# Patient Record
Sex: FEMALE | Race: BLACK OR AFRICAN AMERICAN | Employment: UNEMPLOYED | URBAN - METROPOLITAN AREA
[De-identification: names, ages, dates, MRNs, and addresses within clinical notes are randomized per-mention and may not be internally consistent; named-entity substitution may affect disease eponyms.]

---

## 2017-02-02 ENCOUNTER — OFFICE VISIT (OUTPATIENT)
Dept: FAMILY MEDICINE CLINIC | Age: 37
End: 2017-02-02

## 2017-02-02 VITALS
SYSTOLIC BLOOD PRESSURE: 126 MMHG | BODY MASS INDEX: 32.57 KG/M2 | WEIGHT: 177 LBS | TEMPERATURE: 98 F | HEIGHT: 62 IN | HEART RATE: 79 BPM | RESPIRATION RATE: 18 BRPM | DIASTOLIC BLOOD PRESSURE: 90 MMHG

## 2017-02-02 DIAGNOSIS — F41.9 ANXIETY: ICD-10-CM

## 2017-02-02 DIAGNOSIS — R53.83 FATIGUE, UNSPECIFIED TYPE: ICD-10-CM

## 2017-02-02 DIAGNOSIS — Z00.00 ANNUAL PHYSICAL EXAM: Primary | ICD-10-CM

## 2017-02-02 DIAGNOSIS — R73.03 PREDIABETES: ICD-10-CM

## 2017-02-02 DIAGNOSIS — M77.8 ELBOW TENDONITIS: ICD-10-CM

## 2017-02-02 RX ORDER — ALPRAZOLAM 0.5 MG/1
0.5 TABLET ORAL
Qty: 30 TAB | Refills: 0 | Status: SHIPPED | OUTPATIENT
Start: 2017-02-02 | End: 2017-12-01 | Stop reason: SDUPTHER

## 2017-02-02 RX ORDER — CITALOPRAM 20 MG/1
20 TABLET, FILM COATED ORAL DAILY
Qty: 30 TAB | Refills: 5 | Status: SHIPPED | OUTPATIENT
Start: 2017-02-02 | End: 2017-04-03 | Stop reason: ALTCHOICE

## 2017-02-02 RX ORDER — DICLOFENAC SODIUM 75 MG/1
75 TABLET, DELAYED RELEASE ORAL 2 TIMES DAILY
Qty: 60 TAB | Refills: 0 | Status: SHIPPED | OUTPATIENT
Start: 2017-02-02 | End: 2017-09-18

## 2017-02-02 RX ORDER — PREDNISONE 5 MG/1
TABLET ORAL
Qty: 21 TAB | Refills: 0 | Status: SHIPPED | OUTPATIENT
Start: 2017-02-02 | End: 2017-04-03 | Stop reason: ALTCHOICE

## 2017-02-02 NOTE — PATIENT INSTRUCTIONS
Elbow: Exercises  Your Care Instructions  Here are some examples of exercises for elbows. Start each exercise slowly. Ease off the exercise if you start to have pain. Your doctor or physical therapist will tell you when you can start these exercises and which ones will work best for you. How to do the exercises  Wrist flexor stretch    1. Extend your arm in front of you with your palm up. 2. Bend your wrist, pointing your hand toward the floor. 3. With your other hand, gently bend your wrist farther until you feel a mild to moderate stretch in your forearm. 4. Hold for at least 15 to 30 seconds. Repeat 2 to 4 times. Wrist extensor stretch    Repeat steps 1 to 4 of the stretch above but begin with your extended hand palm down. Ball or sock squeeze    1. Hold a tennis ball (or a rolled-up sock) in your hand. 2. Make a fist around the ball (or sock) and squeeze. 3. Hold for about 6 seconds, and then relax for up to 10 seconds. 4. Repeat 8 to 12 times. 5. Switch the ball (or sock) to your other hand and do 8 to 12 times. Wrist deviation    1. Sit so that your arm is supported but your hand hangs off the edge of a flat surface, such as a table. 2. Hold your hand out like you are shaking hands with someone. 3. Move your hand up and down. 4. Repeat this motion 8 to 12 times. 5. Switch arms. 6. Try to do this exercise twice with each hand. Wrist curls    1. Place your forearm on a table with your hand hanging over the edge of the table, palm up. 2. Place a 1- to 2-pound weight in your hand. This may be a dumbbell, a can of food, or a filled water bottle. 3. Slowly raise and lower the weight while keeping your forearm on the table and your palm facing up. 4. Repeat this motion 8 to 12 times. 5. Switch arms, and do steps 1 through 4.  6. Repeat with your hand facing down toward the floor. Switch arms. Biceps curls    1.  Sit leaning forward with your legs slightly spread and your left hand on your left thigh. 2. Place your right elbow on your right thigh, and hold the weight with your forearm horizontal.  3. Slowly curl the weight up and toward your chest.  4. Repeat this motion 8 to 12 times. 5. Switch arms, and do steps 1 through 4. Follow-up care is a key part of your treatment and safety. Be sure to make and go to all appointments, and call your doctor if you are having problems. It's also a good idea to know your test results and keep a list of the medicines you take. Where can you learn more? Go to http://aidan-faiza.info/. Enter M279 in the search box to learn more about \"Elbow: Exercises. \"  Current as of: May 23, 2016  Content Version: 11.1  © 0509-2819 [employer]. Care instructions adapted under license by [email or web address] (which disclaims liability or warranty for this information). If you have questions about a medical condition or this instruction, always ask your healthcare professional. [address] any warranty or liability for your use of this information.

## 2017-02-02 NOTE — PROGRESS NOTES
Chief Complaint   Patient presents with    Physical    Labs     Patient in office today for physical and fasting labs; Would like medication refilled; Pt also have c/o of right elbow pain; states pain is worse during certain movement; pain began November. 1. Have you been to the ER, urgent care clinic since your last visit? Hospitalized since your last visit? No    2. Have you seen or consulted any other health care providers outside of the [address] [place] [address] since your last visit? Include any pap smears or colon screening. No    Noticed in November before thanksgiving after she moved. Only in the elbow. Certain movements and lifting aggravate the pain. Stiff. Denies any swelling or redness. Taking motrin OTC as needed. Denies any tingling in fingers or decreased  strength. Notices it daily. Denies any cp, sob, and dyspnea. Denies any ha or dizziness. Having occasional HAs. History of HAs after having hysterectomy about 3 years ago. Noticing she has been having them more frequently. Waking up with a HA. HA every other week and lasting for about 2 days. Day before yesterday had a HA. Pt is constantly on the phone so this can make the HA worse. Associated nausea. Usually HA is behind the eye. Last eye exam was 2 weeks ago. Pt has been following a diet to lose weight. Drinking more shakes. Wondering if sx are due to being hungry. Unrelieved by motrin or BC powder. Denies any photo or phonophobia. Denies any other concerns at this time. Chief Complaint   Patient presents with   Evaristo Campos     she is a 43y.o. year old female who presents for evalution. Reviewed PmHx, RxHx, FmHx, SocHx, AllgHx and updated and dated in the chart.     Review of Systems - negative except as listed above in the HPI    Objective:     Vitals:    02/02/17 1046   BP: 126/90   Pulse: 79   Resp: 18   Temp: 98 °F (36.7 °C)   TempSrc: Oral   Weight: 177 lb (80.3 kg)   Height: 5' 2\" (1.575 m)     Physical Examination: General appearance - alert, well appearing, and in no distress  Mental status - normal mood and behavior  Eyes - pupils equal and reactive, extraocular eye movements intact  Ears - bilateral TM's and external ear canals normal  Nose - normal and patent, no erythema, discharge or polyps and normal nontender sinuses  Mouth - mucous membranes moist, pharynx normal without lesions  Neck - supple, no significant adenopathy, carotids upstroke normal bilaterally, no bruits, thyroid exam: thyroid is normal in size without nodules or tenderness  Chest - clear to auscultation, no wheezes, rales or rhonchi, symmetric air entry  Heart - normal rate, regular rhythm, normal S1, S2  Musculoskeletal - no joint tenderness, deformity or swelling, no muscular tenderness noted  Extremities - peripheral pulses normal, no ankle edema, no clubbing or cyanosis  Skin - normal coloration and turgor, no rashes, no suspicious skin lesions noted    Assessment/ Plan:   Erika was seen today for physical and labs. Diagnoses and all orders for this visit:    Annual physical exam  -     AMB POC URINALYSIS DIP STICK AUTO W/O MICRO  -     LIPID PANEL  -     METABOLIC PANEL, COMPREHENSIVE  -     CBC WITH AUTOMATED DIFF  Healthy appearing 43year old female. Elbow tendonitis  -     predniSONE (STERAPRED) 5 mg dose pack; See administration instruction per 5mg dose pack  -     diclofenac EC (VOLTAREN) 75 mg EC tablet; Take 1 Tab by mouth two (2) times a day. Take meds as directed. Reviewed stretches and other supportive measures. Follow up if sx persist or worsen. Anxiety  -     ALPRAZolam (XANAX) 0.5 mg tablet; Take 1 Tab by mouth daily as needed for Anxiety. -     citalopram (CELEXA) 20 mg tablet; Take 1 Tab by mouth daily. Refilled rx. Continue celexa daily and xanax sparingly as needed for acute sx. Prediabetes  -     HEMOGLOBIN A1C WITH EAG  Will notify results and deviate plan based on findings.    Fatigue, unspecified type  -     TSH 3RD GENERATION  -     VITAMIN D, 25 HYDROXY  Will notify results and deviate plan based on findings. Enc to continue with diet and exercise efforts to facilitate weight loss. Other orders  -     Cancel: PAP IG, APTIMA HPV AND RFX 16/18,45 ([id number])  No longer a candidate for paps. Had a pap after hysterectomy that was normal.      Follow-up Disposition:  Return if symptoms worsen or fail to improve. I have discussed the diagnosis with the patient and the intended plan as seen in the above orders. The patient has received an after-visit summary and questions were answered concerning future plans. Medication Side Effects and Warnings were discussed with patient: yes  Patient Labs were reviewed and or requested: yes  Patient Past Records were reviewed and or requested  yes  Patient / Caregiver Understanding of treatment plan was verbalized during office visit YES    FRANK Lepe    Patient Instructions        Elbow: Exercises  Your Care Instructions  Here are some examples of exercises for elbows. Start each exercise slowly. Ease off the exercise if you start to have pain. Your doctor or physical therapist will tell you when you can start these exercises and which ones will work best for you. How to do the exercises  Wrist flexor stretch    1. Extend your arm in front of you with your palm up. 2. Bend your wrist, pointing your hand toward the floor. 3. With your other hand, gently bend your wrist farther until you feel a mild to moderate stretch in your forearm. 4. Hold for at least 15 to 30 seconds. Repeat 2 to 4 times. Wrist extensor stretch    Repeat steps 1 to 4 of the stretch above but begin with your extended hand palm down. Ball or sock squeeze    1. Hold a tennis ball (or a rolled-up sock) in your hand. 2. Make a fist around the ball (or sock) and squeeze. 3. Hold for about 6 seconds, and then relax for up to 10 seconds. 4. Repeat 8 to 12 times.   5. Switch the ball (or sock) to your other hand and do 8 to 12 times. Wrist deviation    1. Sit so that your arm is supported but your hand hangs off the edge of a flat surface, such as a table. 2. Hold your hand out like you are shaking hands with someone. 3. Move your hand up and down. 4. Repeat this motion 8 to 12 times. 5. Switch arms. 6. Try to do this exercise twice with each hand. Wrist curls    1. Place your forearm on a table with your hand hanging over the edge of the table, palm up. 2. Place a 1- to 2-pound weight in your hand. This may be a dumbbell, a can of food, or a filled water bottle. 3. Slowly raise and lower the weight while keeping your forearm on the table and your palm facing up. 4. Repeat this motion 8 to 12 times. 5. Switch arms, and do steps 1 through 4.  6. Repeat with your hand facing down toward the floor. Switch arms. Biceps curls    1. Sit leaning forward with your legs slightly spread and your left hand on your left thigh. 2. Place your right elbow on your right thigh, and hold the weight with your forearm horizontal.  3. Slowly curl the weight up and toward your chest.  4. Repeat this motion 8 to 12 times. 5. Switch arms, and do steps 1 through 4. Follow-up care is a key part of your treatment and safety. Be sure to make and go to all appointments, and call your doctor if you are having problems. It's also a good idea to know your test results and keep a list of the medicines you take. Where can you learn more? Go to http://aidan-faiza.info/. Enter M279 in the search box to learn more about \"Elbow: Exercises. \"  Current as of: May 23, 2016  Content Version: 11.1  © 2869-4898 [employer]. Care instructions adapted under license by [employer] (which disclaims liability or warranty for this information).  If you have questions about a medical condition or this instruction, always ask your healthcare professional. [employer] disclaims any warranty or liability for your use of this information.

## 2017-02-02 NOTE — PROGRESS NOTES
Chief Complaint   Patient presents with    Physical    Labs     Patient in office today for physical and fasting labs; would like medication refilled; also have c/o of right elbow pain; states pain is worse during certain movement; that began November. 1. Have you been to the ER, urgent care clinic since your last visit? Hospitalized since your last visit? No    2. Have you seen or consulted any other health care providers outside of the [address] [place] [address] since your last visit? Include any pap smears or colon screening.  No

## 2017-02-02 NOTE — MR AVS SNAPSHOT
Visit Information Date & Time Provider Department Dept. Phone Encounter #  
 2/2/2017 10:30 AM Rangel Lorenzo NP [facility] [phone] [id number] Follow-up Instructions Return if symptoms worsen or fail to improve. Upcoming Health Maintenance Date Due INFLUENZA AGE 9 TO ADULT 8/1/2016 PAP AKA CERVICAL CYTOLOGY 6/17/2018 DTaP/Tdap/Td series (2 - Td) 6/1/2024 Allergies as of 2/2/2017  Review Complete On: 2/2/2017 By: Rangel Lorenzo NP Severity Noted Reaction Type Reactions Sulfa (Sulfonamide Antibiotics) High 10/23/2014   Systemic Hives, Itching Current Immunizations  Never Reviewed Name Date Tdap 6/1/2014 Not reviewed this visit You Were Diagnosed With   
  
 Codes Comments Annual physical exam    -  Primary ICD-10-CM: Z00.00 ICD-9-CM: V70.0 Elbow tendonitis     ICD-10-CM: M77.8 ICD-9-CM: 727.09 Anxiety     ICD-10-CM: F41.9 ICD-9-CM: 300.00 Prediabetes     ICD-10-CM: R73.03 
ICD-9-CM: 790.29 Fatigue, unspecified type     ICD-10-CM: R53.83 ICD-9-CM: 780.79 Vitals BP Pulse Temp Resp Height(growth percentile) Weight(growth percentile) 126/90 (BP 1 Location: Right arm, BP Patient Position: Sitting) 79 98 °F (36.7 °C) (Oral) 18 5' 2\" (1.575 m) 177 lb (80.3 kg) LMP BMI OB Status Smoking Status (Exact Date) 32.37 kg/m2 Hysterectomy Never Smoker Vitals History BMI and BSA Data Body Mass Index Body Surface Area  
 32.37 kg/m 2 1.87 m 2 Preferred Pharmacy Pharmacy Name Phone [address], [address] [place] [address] [phone] Your Updated Medication List  
  
   
This list is accurate as of: 2/2/17 11:27 AM.  Always use your most recent med list.  
  
  
  
  
 ALPRAZolam 0.5 mg tablet Commonly known as:  Cain Sequin Take 1 Tab by mouth daily as needed for Anxiety. citalopram 20 mg tablet Commonly known as:  Ama Pierce Take 1 Tab by mouth daily. diclofenac EC 75 mg EC tablet Commonly known as:  VOLTAREN Take 1 Tab by mouth two (2) times a day. predniSONE 5 mg dose pack Commonly known as:  STERAPRED See administration instruction per 5mg dose pack Prescriptions Printed Refills ALPRAZolam (XANAX) 0.5 mg tablet 0 Sig: Take 1 Tab by mouth daily as needed for Anxiety. Class: Print Route: Oral  
  
Prescriptions Sent to Pharmacy Refills  
 citalopram (CELEXA) 20 mg tablet 5 Sig: Take 1 Tab by mouth daily. Class: Normal  
 Pharmacy: [address] Ph #: [phone] Route: Oral  
 predniSONE (STERAPRED) 5 mg dose pack 0 Sig: See administration instruction per 5mg dose pack Class: Normal  
 Pharmacy: [address] Ph #: [phone]  
 diclofenac EC (VOLTAREN) 75 mg EC tablet 0 Sig: Take 1 Tab by mouth two (2) times a day. Class: Normal  
 Pharmacy: [address] Ph #: [phone] Route: Oral  
  
We Performed the Following AMB POC URINALYSIS DIP STICK AUTO W/O MICRO [19459 CPT(R)] CBC WITH AUTOMATED DIFF [20262 CPT(R)] HEMOGLOBIN A1C WITH EAG [22412 CPT(R)] LIPID PANEL [97127 CPT(R)] METABOLIC PANEL, COMPREHENSIVE [63740 CPT(R)] TSH 3RD GENERATION [84224 CPT(R)] VITAMIN D, 25 HYDROXY [id number] CPT(R)] Follow-up Instructions Return if symptoms worsen or fail to improve. Patient Instructions Elbow: Exercises Your Care Instructions Here are some examples of exercises for elbows. Start each exercise slowly. Ease off the exercise if you start to have pain. Your doctor or physical therapist will tell you when you can start these exercises and which ones will work best for you. How to do the exercises Wrist flexor stretch 1. Extend your arm in front of you with your palm up. 2. Bend your wrist, pointing your hand toward the floor. 3. With your other hand, gently bend your wrist farther until you feel a mild to moderate stretch in your forearm. 4. Hold for at least 15 to 30 seconds. Repeat 2 to 4 times. Wrist extensor stretch Repeat steps 1 to 4 of the stretch above but begin with your extended hand palm down. Ball or sock squeeze 1. Hold a tennis ball (or a rolled-up sock) in your hand. 2. Make a fist around the ball (or sock) and squeeze. 3. Hold for about 6 seconds, and then relax for up to 10 seconds. 4. Repeat 8 to 12 times. 5. Switch the ball (or sock) to your other hand and do 8 to 12 times. Wrist deviation 1. Sit so that your arm is supported but your hand hangs off the edge of a flat surface, such as a table. 2. Hold your hand out like you are shaking hands with someone. 3. Move your hand up and down. 4. Repeat this motion 8 to 12 times. 5. Switch arms. 6. Try to do this exercise twice with each hand. Wrist curls 1. Place your forearm on a table with your hand hanging over the edge of the table, palm up. 2. Place a 1- to 2-pound weight in your hand. This may be a dumbbell, a can of food, or a filled water bottle. 3. Slowly raise and lower the weight while keeping your forearm on the table and your palm facing up. 4. Repeat this motion 8 to 12 times. 5. Switch arms, and do steps 1 through 4. 
6. Repeat with your hand facing down toward the floor. Switch arms. Biceps curls 1. Sit leaning forward with your legs slightly spread and your left hand on your left thigh. 2. Place your right elbow on your right thigh, and hold the weight with your forearm horizontal. 
3. Slowly curl the weight up and toward your chest. 
4. Repeat this motion 8 to 12 times. 5. Switch arms, and do steps 1 through 4. Follow-up care is a key part of your treatment and safety.  Be sure to make and go to all appointments, and call your doctor if you are having problems. It's also a good idea to know your test results and keep a list of the medicines you take. Where can you learn more? Go to http://aidan-faiza.info/. Enter M279 in the search box to learn more about \"Elbow: Exercises. \" Current as of: May 23, 2016 Content Version: 11.1 © 2282-6647 [employer]. Care instructions adapted under license by [facility] (which disclaims liability or warranty for this information). If you have questions about a medical condition or this instruction, always ask your healthcare professional. [address] any warranty or liability for your use of this information. Introducing [facility]! Dear Damien Gill: 
Thank you for requesting a K[employer] account. Our records indicate that you already have an active K[employer] account. You can access your account anytime at [email or web address] Did you know that you can access your hospital and ER discharge instructions at any time in K[employer]um? You can also review all of your test results from your hospital stay or ER visit. Additional Information If you have questions, please visit the Frequently Asked Questions section of the K[employer]um website at [email or web address]/. Remember, K[employer]um is NOT to be used for urgent needs. For medical emergencies, dial 911. Now available from your iPhone and Android! Please provide this summary of care documentation to your next provider. Your primary care clinician is listed as Mallory Alejandre. If you have any questions after today's visit, please call [phone].

## 2017-02-03 DIAGNOSIS — R73.03 PREDIABETES: Primary | ICD-10-CM

## 2017-02-03 DIAGNOSIS — E55.9 VITAMIN D DEFICIENCY: ICD-10-CM

## 2017-02-03 LAB
25(OH)D3+25(OH)D2 SERPL-MCNC: 18.6 NG/ML (ref 30–100)
ALBUMIN SERPL-MCNC: 4.3 G/DL (ref 3.5–5.5)
ALBUMIN/GLOB SERPL: 1.4 {RATIO} (ref 1.1–2.5)
ALP SERPL-CCNC: 57 IU/L (ref 39–117)
ALT SERPL-CCNC: 12 IU/L (ref 0–32)
AST SERPL-CCNC: 11 IU/L (ref 0–40)
BASOPHILS # BLD AUTO: 0 X10E3/UL (ref 0–0.2)
BASOPHILS NFR BLD AUTO: 0 %
BILIRUB SERPL-MCNC: 0.3 MG/DL (ref 0–1.2)
BUN SERPL-MCNC: 11 MG/DL (ref 6–20)
BUN/CREAT SERPL: 15 (ref 8–20)
CALCIUM SERPL-MCNC: 8.9 MG/DL (ref 8.7–10.2)
CHLORIDE SERPL-SCNC: 102 MMOL/L (ref 96–106)
CHOLEST SERPL-MCNC: 157 MG/DL (ref 100–199)
CO2 SERPL-SCNC: 25 MMOL/L (ref 18–29)
CREAT SERPL-MCNC: 0.72 MG/DL (ref 0.57–1)
EOSINOPHIL # BLD AUTO: 0.1 X10E3/UL (ref 0–0.4)
EOSINOPHIL NFR BLD AUTO: 2 %
ERYTHROCYTE [DISTWIDTH] IN BLOOD BY AUTOMATED COUNT: 15.4 % (ref 12.3–15.4)
EST. AVERAGE GLUCOSE BLD GHB EST-MCNC: 137 MG/DL
GLOBULIN SER CALC-MCNC: 3 G/DL (ref 1.5–4.5)
GLUCOSE SERPL-MCNC: 86 MG/DL (ref 65–99)
HBA1C MFR BLD: 6.4 % (ref 4.8–5.6)
HCT VFR BLD AUTO: 36.4 % (ref 34–46.6)
HDLC SERPL-MCNC: 47 MG/DL
HGB BLD-MCNC: 11.6 G/DL (ref 11.1–15.9)
IMM GRANULOCYTES # BLD: 0 X10E3/UL (ref 0–0.1)
IMM GRANULOCYTES NFR BLD: 0 %
INTERPRETATION, 910389: NORMAL
LDLC SERPL CALC-MCNC: 92 MG/DL (ref 0–99)
LYMPHOCYTES # BLD AUTO: 2 X10E3/UL (ref 0.7–3.1)
LYMPHOCYTES NFR BLD AUTO: 22 %
MCH RBC QN AUTO: 25.5 PG (ref 26.6–33)
MCHC RBC AUTO-ENTMCNC: 31.9 G/DL (ref 31.5–35.7)
MCV RBC AUTO: 80 FL (ref 79–97)
MONOCYTES # BLD AUTO: 0.5 X10E3/UL (ref 0.1–0.9)
MONOCYTES NFR BLD AUTO: 5 %
NEUTROPHILS # BLD AUTO: 6.2 X10E3/UL (ref 1.4–7)
NEUTROPHILS NFR BLD AUTO: 71 %
PLATELET # BLD AUTO: 319 X10E3/UL (ref 150–379)
POTASSIUM SERPL-SCNC: 4.2 MMOL/L (ref 3.5–5.2)
PROT SERPL-MCNC: 7.3 G/DL (ref 6–8.5)
RBC # BLD AUTO: 4.55 X10E6/UL (ref 3.77–5.28)
SODIUM SERPL-SCNC: 140 MMOL/L (ref 134–144)
TRIGL SERPL-MCNC: 90 MG/DL (ref 0–149)
TSH SERPL DL<=0.005 MIU/L-ACNC: 2.1 UIU/ML (ref 0.45–4.5)
VLDLC SERPL CALC-MCNC: 18 MG/DL (ref 5–40)
WBC # BLD AUTO: 8.8 X10E3/UL (ref 3.4–10.8)

## 2017-02-03 RX ORDER — ERGOCALCIFEROL 1.25 MG/1
50000 CAPSULE ORAL
Qty: 12 CAP | Refills: 1 | Status: SHIPPED | OUTPATIENT
Start: 2017-02-03 | End: 2017-08-21 | Stop reason: SDUPTHER

## 2017-02-03 RX ORDER — METFORMIN HYDROCHLORIDE 500 MG/1
500 TABLET, EXTENDED RELEASE ORAL
Qty: 30 TAB | Refills: 2 | Status: SHIPPED | OUTPATIENT
Start: 2017-02-03 | End: 2017-09-18

## 2017-02-03 NOTE — PROGRESS NOTES
The following message was sent to pt via [facility] [employer] [facility] portal in reference to lab results:    Good morning Ms. Lucien Cabezas are the results of your most recent lab work. I have the following recommendations:    1. Your CBC which looks at your white blood cells, red blood cells, and hemoglobin came back looking normal. No sign of infection or anemia. 2. Your metabolic panel which looks at your blood glucose, liver function, and kidney function looks perfect. 3. Your cholesterol came back looking great so keep up the good work with diet and exercise. 4. Your TSH which screens for thyroid disease came back normal. This means you do not have hyper or hypothyroidism. 5. Your hemoglobin a1c has worsened and you are almost considered to be type 2 diabetic. I would like to start you on Metformin with dinner to prevent this from worsening. Side effects include nausea, bloating, and diarrhea, but these usually subside over time with use. You should also try and follow a low carb diet. Try to watch your portion sizes and limit your intake of sugar and carbohydrates. We want to prevent this from developing into diabetes because having diabetes increases your risk for kidney disease, stroke, heart attack, and vision loss. We can talk more about this during your next office visit. 6. Your vitamin D level is very low or deficient. I would like you to take a once weekly vitamin D supplement over the next 3 months to replete this. Once you have finished that, start taking an over the counter calcium and vitamin D supplement that contains 2,000 IUs of vitamin D daily to prevent your levels from dropping again. Having normal vitamin D levels is important because you need vitamin D to absorb calcium into the bone and having low vitamin D levels increases your risk for osteoporosis down the road. Lets recheck these labs in 3 months.  Please do not hesitate to call me or schedule an appointment to be seen if you need anything else in the meantime :)    Tram Pearl, CARSONP-C

## 2017-02-09 ENCOUNTER — OFFICE VISIT (OUTPATIENT)
Dept: FAMILY MEDICINE CLINIC | Age: 37
End: 2017-02-09

## 2017-02-09 VITALS
TEMPERATURE: 98.3 F | OXYGEN SATURATION: 99 % | HEIGHT: 62 IN | WEIGHT: 175.13 LBS | SYSTOLIC BLOOD PRESSURE: 153 MMHG | RESPIRATION RATE: 20 BRPM | HEART RATE: 60 BPM | DIASTOLIC BLOOD PRESSURE: 108 MMHG | BODY MASS INDEX: 32.23 KG/M2

## 2017-02-09 DIAGNOSIS — G44.59 OTHER COMPLICATED HEADACHE SYNDROME: Primary | ICD-10-CM

## 2017-02-09 RX ORDER — KETOROLAC TROMETHAMINE 30 MG/ML
60 INJECTION, SOLUTION INTRAMUSCULAR; INTRAVENOUS ONCE
Qty: 1 VIAL | Refills: 0
Start: 2017-02-09 | End: 2017-02-09

## 2017-02-09 NOTE — PROGRESS NOTES
1. Have you been to the ER, urgent care clinic since your last visit? Hospitalized since your last visit? No    2. Have you seen or consulted any other health care providers outside of the [address] [place] [address] since your last visit? Include any pap smears or colon screening. No   Chief Complaint   Patient presents with    Headache     having frequent HA- moving from back head to eyes     Chief Complaint   Patient presents with    Headache     having frequent HA- moving from back head to eyes     she is a 43y.o. year old female who presents for evalution. Reviewed PmHx, RxHx, FmHx, SocHx, AllgHx and updated and dated in the chart. Patient Active Problem List    Diagnosis    Prediabetes    Vitamin D deficiency    Elevated blood sugar    Menorrhagia       Review of Systems - negative except as listed above in the HPI    Objective:     Vitals:    02/09/17 0844   BP: (!) 153/108   Pulse: 60   Resp: 20   Temp: 98.3 °F (36.8 °C)   TempSrc: Oral   SpO2: 99%   Weight: 175 lb 2 oz (79.4 kg)   Height: 5' 2\" (1.575 m)     Physical Examination: General appearance - alert, well appearing, and in no distress  Chest - clear to auscultation, no wheezes, rales or rhonchi, symmetric air entry  Heart - normal rate, regular rhythm, normal S1, S2, no murmurs, rubs, clicks or gallops    Assessment/ Plan:   Erika was seen today for headache. Diagnoses and all orders for this visit:    Other complicated headache syndrome  -     ketorolac tromethamine (TORADOL) 60 mg/2 mL soln; 2 mL by IntraMUSCular route once for 1 dose. -     KETOROLAC TROMETHAMINE INJ  -     HI THER/PROPH/DIAG INJECTION, SUBCUT/IM  -inc HA source of inc BP       Follow-up Disposition:  Return if symptoms worsen or fail to improve. I have discussed the diagnosis with the patient and the intended plan as seen in the above orders. The patient understands and agrees with the plan.  The patient has received an after-visit summary and questions were answered concerning future plans. Medication Side Effects and Warnings were discussed with patient  Patient Labs were reviewed and or requested:  Patient Past Records were reviewed and or requested    Shelli Todd M.D. There are no Patient Instructions on file for this visit.

## 2017-02-09 NOTE — MR AVS SNAPSHOT
Visit Information Date & Time Provider Department Dept. Phone Encounter #  
 2/9/2017  8:30 AM Misha Yost MD [address] [facility] [phone] [id number] Follow-up Instructions Return if symptoms worsen or fail to improve. Upcoming Health Maintenance Date Due  
 PAP AKA CERVICAL CYTOLOGY 6/17/2018 DTaP/Tdap/Td series (2 - Td) 6/1/2024 Allergies as of 2/9/2017  Review Complete On: 2/9/2017 By: Misha Yost MD  
  
 Severity Noted Reaction Type Reactions Sulfa (Sulfonamide Antibiotics) High 10/23/2014   Systemic Hives, Itching Current Immunizations  Never Reviewed Name Date Tdap 6/1/2014 Not reviewed this visit You Were Diagnosed With   
  
 Codes Comments Other complicated headache syndrome    -  Primary ICD-10-CM: G44.59 
ICD-9-CM: 339.44 Vitals BP Pulse Temp Resp Height(growth percentile) Weight(growth percentile) (!) 153/108 (BP 1 Location: Left arm, BP Patient Position: Sitting) 60 98.3 °F (36.8 °C) (Oral) 20 5' 2\" (1.575 m) 175 lb 2 oz (79.4 kg) LMP SpO2 BMI OB Status Smoking Status (Exact Date) 99% 32.03 kg/m2 Hysterectomy Never Smoker Vitals History BMI and BSA Data Body Mass Index Body Surface Area 32.03 kg/m 2 1.86 m 2 Preferred Pharmacy Pharmacy Name Phone [place] [phone] Your Updated Medication List  
  
   
This list is accurate as of: 2/9/17  9:34 AM.  Always use your most recent med list.  
  
  
  
  
 ALPRAZolam 0.5 mg tablet Commonly known as:  Cecillia Meredith Take 1 Tab by mouth daily as needed for Anxiety. citalopram 20 mg tablet Commonly known as:  Tammie Jose Alberto Take 1 Tab by mouth daily. diclofenac EC 75 mg EC tablet Commonly known as:  VOLTAREN Take 1 Tab by mouth two (2) times a day. ergocalciferol 50,000 unit capsule Commonly known as:  ERGOCALCIFEROL Take 1 Cap by mouth every seven (7) days. ketorolac tromethamine 60 mg/2 mL Soln Commonly known as:  TORADOL  
2 mL by IntraMUSCular route once for 1 dose. metFORMIN  mg tablet Commonly known as:  GLUCOPHAGE XR Take 1 Tab by mouth daily (with dinner). predniSONE 5 mg dose pack Commonly known as:  STERAPRED See administration instruction per 5mg dose pack We Performed the Following KETOROLAC TROMETHAMINE INJ [ [facility]] MS THER/PROPH/DIAG INJECTION, SUBCUT/IM [id number] CPT(R)] Follow-up Instructions Return if symptoms worsen or fail to improve. Introducing [facility] SERVICES! Dear Damien Gill: 
Thank you for requesting a CytoLogic account. Our records indicate that you already have an active CytoLogic account. You can access your account anytime at [email or web address] Did you know that you can access your hospital and ER discharge instructions at any time in CytoLogic? You can also review all of your test results from your hospital stay or ER visit. Additional Information If you have questions, please visit the Frequently Asked Questions section of the CytoLogic website at [email or web address] Remember, CytoLogic is NOT to be used for urgent needs. For medical emergencies, dial 911. Now available from your iPhone and Android! Please provide this summary of care documentation to your next provider. Your primary care clinician is listed as Mallory Alejandre. If you have any questions after today's visit, please call [phone].

## 2017-02-13 ENCOUNTER — OFFICE VISIT (OUTPATIENT)
Dept: FAMILY MEDICINE CLINIC | Age: 37
End: 2017-02-13

## 2017-02-13 VITALS — WEIGHT: 177 LBS | HEIGHT: 62 IN | RESPIRATION RATE: 20 BRPM | BODY MASS INDEX: 32.57 KG/M2

## 2017-02-13 DIAGNOSIS — Z11.1 PPD SCREENING TEST: Primary | ICD-10-CM

## 2017-02-15 LAB
MM INDURATION POC: 0 MM (ref 0–5)
PPD POC: NORMAL NEGATIVE

## 2017-04-03 ENCOUNTER — OFFICE VISIT (OUTPATIENT)
Dept: FAMILY MEDICINE CLINIC | Age: 37
End: 2017-04-03

## 2017-04-03 VITALS
HEART RATE: 76 BPM | RESPIRATION RATE: 16 BRPM | HEIGHT: 62 IN | OXYGEN SATURATION: 98 % | BODY MASS INDEX: 32.39 KG/M2 | DIASTOLIC BLOOD PRESSURE: 76 MMHG | WEIGHT: 176 LBS | SYSTOLIC BLOOD PRESSURE: 118 MMHG | TEMPERATURE: 98.7 F

## 2017-04-03 DIAGNOSIS — R05.9 COUGH: ICD-10-CM

## 2017-04-03 DIAGNOSIS — F41.9 ANXIETY: ICD-10-CM

## 2017-04-03 DIAGNOSIS — N76.0 ACUTE VAGINITIS: ICD-10-CM

## 2017-04-03 DIAGNOSIS — N89.8 VAGINAL DISCHARGE: ICD-10-CM

## 2017-04-03 DIAGNOSIS — J40 BRONCHITIS: Primary | ICD-10-CM

## 2017-04-03 RX ORDER — SERTRALINE HYDROCHLORIDE 50 MG/1
50 TABLET, FILM COATED ORAL DAILY
Qty: 30 TAB | Refills: 1 | Status: SHIPPED | OUTPATIENT
Start: 2017-04-03 | End: 2018-03-04 | Stop reason: SDUPTHER

## 2017-04-03 RX ORDER — AZITHROMYCIN 250 MG/1
TABLET, FILM COATED ORAL
Qty: 6 TAB | Refills: 0 | Status: SHIPPED | OUTPATIENT
Start: 2017-04-03 | End: 2017-08-18 | Stop reason: ALTCHOICE

## 2017-04-03 RX ORDER — CODEINE PHOSPHATE AND GUAIFENESIN 10; 100 MG/5ML; MG/5ML
SOLUTION ORAL
Qty: 120 ML | Refills: 0 | Status: SHIPPED | OUTPATIENT
Start: 2017-04-03 | End: 2017-08-18 | Stop reason: ALTCHOICE

## 2017-04-03 NOTE — MR AVS SNAPSHOT
Visit Information Date & Time Provider Department Dept. Phone Encounter #  
 4/3/2017  2:45 PM Jan Trammell NP [address] [facility] [phone] [id number] Follow-up Instructions Return if symptoms worsen or fail to improve. Upcoming Health Maintenance Date Due  
 PAP AKA CERVICAL CYTOLOGY 6/17/2018 DTaP/Tdap/Td series (2 - Td) 6/1/2024 Allergies as of 4/3/2017  Review Complete On: 4/3/2017 By: Jan Trammell NP Severity Noted Reaction Type Reactions Sulfa (Sulfonamide Antibiotics) High 10/23/2014   Systemic Hives, Itching Current Immunizations  Reviewed on 2/13/2017 Name Date  
 TB Skin Test (PPD) Intradermal 2/13/2017 Tdap 6/1/2014 Not reviewed this visit You Were Diagnosed With   
  
 Codes Comments Vaginal discharge    -  Primary ICD-10-CM: N89.8 ICD-9-CM: 623.5 Acute vaginitis     ICD-10-CM: N76.0 ICD-9-CM: 616.10 Bronchitis     ICD-10-CM: J40 ICD-9-CM: 729 Cough     ICD-10-CM: R05 ICD-9-CM: 032. 2 Vitals BP Pulse Temp Resp Height(growth percentile) Weight(growth percentile) 118/76 76 98.7 °F (37.1 °C) (Oral) 16 5' 2\" (1.575 m) 176 lb (79.8 kg) LMP SpO2 BMI OB Status Smoking Status (Exact Date) 98% 32.19 kg/m2 Hysterectomy Never Smoker Vitals History BMI and BSA Data Body Mass Index Body Surface Area  
 32.19 kg/m 2 1.87 m 2 Preferred Pharmacy Pharmacy Name Phone [facility], [address] [phone] Your Updated Medication List  
  
   
This list is accurate as of: 4/3/17  3:02 PM.  Always use your most recent med list.  
  
  
  
  
 ALPRAZolam 0.5 mg tablet Commonly known as:  Hemphill Hummingbird Take 1 Tab by mouth daily as needed for Anxiety. azithromycin 250 mg tablet Commonly known as:  Chava Sages Take 2 tabs po today then 1 tab po x 4 days  
  
 citalopram 20 mg tablet Commonly known as:  Tyler Gonzalez Take 1 Tab by mouth daily. diclofenac EC 75 mg EC tablet Commonly known as:  VOLTAREN Take 1 Tab by mouth two (2) times a day. ergocalciferol 50,000 unit capsule Commonly known as:  ERGOCALCIFEROL Take 1 Cap by mouth every seven (7) days. guaiFENesin-codeine 100-10 mg/5 mL solution Commonly known as:  ROBITUSSIN AC Use 1-2 tsp at bedtime prn  
  
 metFORMIN  mg tablet Commonly known as:  GLUCOPHAGE XR Take 1 Tab by mouth daily (with dinner). Prescriptions Printed Refills  
 guaiFENesin-codeine (ROBITUSSIN AC) 100-10 mg/5 mL solution 0 Sig: Use 1-2 tsp at bedtime prn Class: Print Prescriptions Sent to Pharmacy Refills  
 azithromycin (ZITHROMAX) 250 mg tablet 0 Sig: Take 2 tabs po today then 1 tab po x 4 days Class: Normal  
 Pharmacy: [facility] #: [phone] We Performed the Following [address] [id number] Custom] Follow-up Instructions Return if symptoms worsen or fail to improve. Patient Instructions Reactive Airway Disease: Care Instructions Your Care Instructions Reactive airway disease is a breathing problem that appears as wheezing, a whistling noise in your airways. It may be caused by a viral or bacterial infection, allergies, tobacco smoke, or something else in the environment. When you are around these triggers, your body releases chemicals that make the airways get tight. Reactive airway disease is a lot like asthma. Both can cause wheezing. But asthma is ongoing, while reactive airway disease may occur only now and then. Tests can be done to tell whether you have asthma. You may take the same medicines used to treat asthma. Good home care and follow-up care with your doctor can help you recover. Follow-up care is a key part of your treatment and safety.  Be sure to make and go to all appointments, and call your doctor if you are having problems. It's also a good idea to know your test results and keep a list of the medicines you take. How can you care for yourself at home? · Take your medicines exactly as prescribed. Call your doctor if you think you are having a problem with your medicine. · Do not smoke or allow others to smoke around you. If you need help quitting, talk to your doctor about stop-smoking programs and medicines. These can increase your chances of quitting for good. · If you know what caused your wheezing (such as perfume or the odor of household chemicals), try to avoid it in the future. · Wash your hands several times a day, and consider using hand gels or wipes that contain alcohol. This can prevent colds and other infections. When should you call for help? Call 911 anytime you think you may need emergency care. For example, call if: 
· You have severe trouble breathing. Watch closely for changes in your health, and be sure to contact your doctor if: 
· You cough up yellow, dark brown, or bloody mucus. · You have a fever. · Your wheezing gets worse. Where can you learn more? Go to http://aidan-faiza.info/. Enter T452 in the search box to learn more about \"Reactive Airway Disease: Care Instructions. \" Current as of: May 23, 2016 Content Version: 11.2 © 2808-2563 Healthwise, Incorporated. Care instructions adapted under license by [employer]imb[employer] (which disclaims liability or warranty for this information). If you have questions about a medical condition or this instruction, always ask your healthcare professional. [place] [address] any warranty or liability for your use of this information. Introducing [facility] & HEALTH SERVICES! Dear Giovanna Luna: 
Thank you for requesting a OZON.ru account. Our records indicate that you already have an active OZON.ru account.   You can access your account anytime at [email or web address] Did you know that you can access your hospital and ER discharge instructions at any time in Crambu? You can also review all of your test results from your hospital stay or ER visit. Additional Information If you have questions, please visit the Frequently Asked Questions section of the Crambu website at [email or web address] Remember, Crambu is NOT to be used for urgent needs. For medical emergencies, dial 911. Now available from your iPhone and Android! Please provide this summary of care documentation to your next provider. Your primary care clinician is listed as Mallory Alejandre. If you have any questions after today's visit, please call [phone].

## 2017-04-03 NOTE — PROGRESS NOTES
Chief Complaint   Patient presents with    Gyn Exam    Anxiety     Discuss Changing Medication     Cough     x one week; Tried several OTC Medicatio w/o relief       1. Have you been to the ER, urgent care clinic since your last visit? Hospitalized since your last visit? No    2. Have you seen or consulted any other health care providers outside of the [address] [place] [address] since your last visit? Include any pap smears or colon screening.  No

## 2017-04-03 NOTE — PROGRESS NOTES
Chief Complaint   Patient presents with    Gyn Exam    Anxiety     Discuss Changing Medication     Cough     x one week; Tried several OTC Medication w/o relief     she is a 43y.o. year old female who presents for evalution. Pt having some vaginal discharge and irritation, requesting STD testing. Is sexually active. Pt states has been having congestion and coughing since last week. Taking numerous OTC but nothing is really helping. Lots of post-nasal drip, keeping pt up at night time. Celexa pt has taken previously and was helpful but tried restarting and caused excessive fatigue. Would like new medication today that is less sedating. Reviewed PmHx, RxHx, FmHx, SocHx, AllgHx and updated and dated in the chart. Review of Systems - negative except as listed above in the HPI    Objective:     Vitals:    04/03/17 1442   BP: 118/76   Pulse: 76   Resp: 16   Temp: 98.7 °F (37.1 °C)   TempSrc: Oral   SpO2: 98%   Weight: 176 lb (79.8 kg)   Height: 5' 2\" (1.575 m)     Physical Examination: General appearance - alert, well appearing, and in no distress  Mental status - alert, oriented to person, place, and time, anxious  Ears - bilateral TM's and external ear canals normal  Nose - normal nontender sinuses, mucosal congestion and mucosal erythema  Mouth - mucous membranes moist, pharynx normal without lesions and erythematous  Neck - supple, no significant adenopathy  Chest - clear to auscultation, no wheezes, rales or rhonchi, symmetric air entry, coarse breath sounds  Heart - normal rate, regular rhythm, normal S1, S2, no murmurs, rubs, clicks or gallops  Pelvic - exam declined by the patient    Assessment/ Plan:   Erika was seen today for gyn exam, anxiety and cough. Diagnoses and all orders for this visit:    Vaginal discharge  -     NUSWAB VAGINITIS PLUS  Will decide on F/U after reviewing labs. Acute vaginitis  -     NUSWAB VAGINITIS PLUS  Will decide on F/U after reviewing labs. Bronchitis  -     azithromycin (ZITHROMAX) 250 mg tablet; Take 2 tabs po today then 1 tab po x 4 days  New rx. Discussed and encouraged rest and clear fluids, if congestion is thick should avoid dairy. Recommended hot showers with steam and elevating head of bed. May use Vitamin C or Echinacea in addition to current therapy. Cough  -     guaiFENesin-codeine (ROBITUSSIN AC) 100-10 mg/5 mL solution; Use 1-2 tsp at bedtime prn  New rx. Anxiety  -     sertraline (ZOLOFT) 50 mg tablet; Take 1 Tab by mouth daily. New rx. Positive thinking, rely on support system. F/U prn    Pt voiced understanding regarding plan of care. Follow-up Disposition:  Return if symptoms worsen or fail to improve. I have discussed the diagnosis with the patient and the intended plan as seen in the above orders. The patient has received an after-visit summary and questions were answered concerning future plans.      Medication Side Effects and Warnings were discussed with patient    Cayden Natarajan NP

## 2017-04-03 NOTE — PATIENT INSTRUCTIONS
Reactive Airway Disease: Care Instructions  Your Care Instructions  Reactive airway disease is a breathing problem that appears as wheezing, a whistling noise in your airways. It may be caused by a viral or bacterial infection, allergies, tobacco smoke, or something else in the environment. When you are around these triggers, your body releases chemicals that make the airways get tight. Reactive airway disease is a lot like asthma. Both can cause wheezing. But asthma is ongoing, while reactive airway disease may occur only now and then. Tests can be done to tell whether you have asthma. You may take the same medicines used to treat asthma. Good home care and follow-up care with your doctor can help you recover. Follow-up care is a key part of your treatment and safety. Be sure to make and go to all appointments, and call your doctor if you are having problems. It's also a good idea to know your test results and keep a list of the medicines you take. How can you care for yourself at home? · Take your medicines exactly as prescribed. Call your doctor if you think you are having a problem with your medicine. · Do not smoke or allow others to smoke around you. If you need help quitting, talk to your doctor about stop-smoking programs and medicines. These can increase your chances of quitting for good. · If you know what caused your wheezing (such as perfume or the odor of household chemicals), try to avoid it in the future. · Wash your hands several times a day, and consider using hand gels or wipes that contain alcohol. This can prevent colds and other infections. When should you call for help? Call 911 anytime you think you may need emergency care. For example, call if:  · You have severe trouble breathing. Watch closely for changes in your health, and be sure to contact your doctor if:  · You cough up yellow, dark brown, or bloody mucus. · You have a fever. · Your wheezing gets worse.   Where can you learn more? Go to http://aidan-faiza.info/. Enter A028 in the search box to learn more about \"Reactive Airway Disease: Care Instructions. \"  Current as of: May 23, 2016  Content Version: 11.2  © 4431-8651 [employer]. Care instructions adapted under license by Iterable (which disclaims liability or warranty for this information). If you have questions about a medical condition or this instruction, always ask your healthcare professional. [place] [address] any warranty or liability for your use of this information.

## 2017-04-06 LAB
A VAGINAE DNA VAG QL NAA+PROBE: ABNORMAL SCORE
BVAB2 DNA VAG QL NAA+PROBE: ABNORMAL SCORE
C ALBICANS DNA VAG QL NAA+PROBE: NEGATIVE
C GLABRATA DNA VAG QL NAA+PROBE: NEGATIVE
C TRACH RRNA SPEC QL NAA+PROBE: NEGATIVE
MEGA1 DNA VAG QL NAA+PROBE: ABNORMAL SCORE
N GONORRHOEA RRNA SPEC QL NAA+PROBE: NEGATIVE
T VAGINALIS RRNA SPEC QL NAA+PROBE: NEGATIVE

## 2017-04-06 RX ORDER — METRONIDAZOLE 500 MG/1
500 TABLET ORAL 2 TIMES DAILY
Qty: 14 TAB | Refills: 0 | Status: SHIPPED | OUTPATIENT
Start: 2017-04-06 | End: 2017-04-13

## 2017-08-18 ENCOUNTER — OFFICE VISIT (OUTPATIENT)
Dept: FAMILY MEDICINE CLINIC | Age: 37
End: 2017-08-18

## 2017-08-18 VITALS
OXYGEN SATURATION: 99 % | BODY MASS INDEX: 31.65 KG/M2 | RESPIRATION RATE: 16 BRPM | DIASTOLIC BLOOD PRESSURE: 90 MMHG | HEIGHT: 62 IN | WEIGHT: 172 LBS | HEART RATE: 83 BPM | TEMPERATURE: 98.2 F | SYSTOLIC BLOOD PRESSURE: 131 MMHG

## 2017-08-18 DIAGNOSIS — E55.9 VITAMIN D DEFICIENCY: ICD-10-CM

## 2017-08-18 DIAGNOSIS — B35.9 TINEA: ICD-10-CM

## 2017-08-18 DIAGNOSIS — R06.83 SNORING: ICD-10-CM

## 2017-08-18 DIAGNOSIS — R53.83 FATIGUE, UNSPECIFIED TYPE: ICD-10-CM

## 2017-08-18 DIAGNOSIS — G47.10 HYPERSOMNIA: ICD-10-CM

## 2017-08-18 DIAGNOSIS — R73.03 PREDIABETES: Primary | ICD-10-CM

## 2017-08-18 RX ORDER — CLOTRIMAZOLE AND BETAMETHASONE DIPROPIONATE 10; .64 MG/G; MG/G
CREAM TOPICAL
Qty: 15 G | Refills: 1 | Status: SHIPPED | OUTPATIENT
Start: 2017-08-18 | End: 2017-09-18

## 2017-08-18 NOTE — PROGRESS NOTES
Chief Complaint   Patient presents with    Follow-up     3 months    Rash     Both Arms, x1 month     she is a 43y.o. year old female who presents for evalution. Pt here for fasting labs. Rash under armpits for 1-2 months. Very itchy. Has been using neosporin but not helping. Pt also complaining of fatigue, wakes up and does not feel well rested. Present for past 3 weeks. Pt does snore, boyfriend has said sometimes sounds like pt stops breathing. Falls asleep while on couch watching TV or even if daughter is talking to her. Pt also feels like is having issues with concentration. Reviewed PmHx, RxHx, FmHx, SocHx, AllgHx and updated and dated in the chart. Review of Systems - negative except as listed above in the HPI    Objective:     Vitals:    08/18/17 0957   BP: 131/90   Pulse: 83   Resp: 16   Temp: 98.2 °F (36.8 °C)   TempSrc: Oral   SpO2: 99%   Weight: 172 lb (78 kg)   Height: 5' 2\" (1.575 m)     Physical Examination: General appearance - alert, well appearing, and in no distress  Chest - clear to auscultation, no wheezes, rales or rhonchi, symmetric air entry  Heart - normal rate, regular rhythm, normal S1, S2, no murmurs, rubs, clicks or gallops  Skin - bilateral axilla have patches with active borders and peeling consistent with tinea     Assessment/ Plan:   Diagnoses and all orders for this visit:    1. Prediabetes  -     METABOLIC PANEL, COMPREHENSIVE  -     HEMOGLOBIN A1C WITH EAG  Will decide on F/U after reviewing labs. 2. Vitamin D deficiency  -     VITAMIN D, 25 HYDROXY  Will decide on F/U after reviewing labs. 3. Snoring  -     REFERRAL TO SLEEP STUDIES    4. Fatigue, unspecified type  -     REFERRAL TO SLEEP STUDIES  -     CBC WITH AUTOMATED DIFF  Will decide on F/U after reviewing labs. 5. Hypersomnia  -     REFERRAL TO SLEEP STUDIES  Will decide on F/U after reviewing labs.      6. Tinea  -     clotrimazole-betamethasone (LOTRISONE) topical cream; Use thin layer BID for no more than 2 weeks  New rx. Keep skin clean and dry. Pt voiced understanding regarding plan of care. Follow-up Disposition:  Return if symptoms worsen or fail to improve. I have discussed the diagnosis with the patient and the intended plan as seen in the above orders. The patient has received an after-visit summary and questions were answered concerning future plans.      Medication Side Effects and Warnings were discussed with patient    Kennedi Contreras NP

## 2017-08-18 NOTE — PROGRESS NOTES
1. Have you been to the ER, urgent care clinic since your last visit? Hospitalized since your last visit? No       2. Have you seen or consulted any other health care providers outside of the [address] [place] [address] since your last visit? Include any pap smears or colon screening.  No    Chief Complaint   Patient presents with    Follow-up     3 months    Rash     Both Arms, x1 month

## 2017-08-18 NOTE — PATIENT INSTRUCTIONS
Ringworm: Care Instructions  Your Care Instructions  Ringworm is a fungus infection of the skin. It is not caused by a worm. Ringworm causes a round, scaly rash that may crack and itch. The rash can spread over a wide area. One type of fungus that causes ringworm is often found in locker rooms and swimming pools. It grows well in warm, moist areas of the skin, such as in skin folds. You can get ringworm by sharing towels, clothing, and sports equipment. You can also get it by touching someone who has ringworm. Ringworm is treated with cream that kills the fungus. If the rash is widespread, you may need pills to get rid of it. Ringworm often comes back after treatment. If the rash becomes infected with bacteria, you may need antibiotics. Follow-up care is a key part of your treatment and safety. Be sure to make and go to all appointments, and call your doctor if you are having problems. It's also a good idea to know your test results and keep a list of the medicines you take. How can you care for yourself at home? · Take your medicines exactly as prescribed. Call your doctor if you have any problems with your medicine. · Wash the rash with soap and water, remove flaky skin, and dry thoroughly. · Try an over-the-counter cream with clotrimazole or miconazole in it. Brand names include Lotrimin, Micatin, and Tinactin. Terbinafine cream (Lamisil) is also available without a prescription. Spread the cream beyond the edge or border of the rash. Follow the directions on the package. Do not stop using the medicine just because your skin clears up. You will probably need to continue treatment for 2 to 4 weeks. · To keep from getting another infection:  ¨ Do not go barefoot in public places such as gyms or locker rooms. Avoid sharing towels and clothes. Use flip-flops or some other type of shoe in the shower.   ¨ Do not wear tight clothes or let your skin stay damp for long periods, such as by staying in a wet bathing suit or sweaty clothes. When should you call for help? Call your doctor now or seek immediate medical care if:  · You have signs of infection such as:  ¨ Pain, warmth, or swelling in your skin. ¨ Red streaks near a wound in the skin. ¨ Pus coming from the rash on your skin. ¨ A fever. Watch closely for changes in your health, and be sure to contact your doctor if:  · Your ringworm does not improve after 2 weeks of treatment. · You do not get better as expected. Where can you learn more? Go to http://aidan-faiza.info/. Enter Q690 in the search box to learn more about \"Ringworm: Care Instructions. \"  Current as of: October 13, 2016  Content Version: 11.3  © 1506-7380 [employer]. Care instructions adapted under license by SlidePay (which disclaims liability or warranty for this information). If you have questions about a medical condition or this instruction, always ask your healthcare professional. [address] any warranty or liability for your use of this information.

## 2017-08-18 NOTE — MR AVS SNAPSHOT
Visit Information Date & Time Provider Department Dept. Phone Encounter #  
 8/18/2017  9:45 AM Anthony Dejesus NP [address] [facility] [phone] [id number] Follow-up Instructions Return if symptoms worsen or fail to improve. Upcoming Health Maintenance Date Due INFLUENZA AGE 9 TO ADULT 8/1/2017 PAP AKA CERVICAL CYTOLOGY 6/17/2018 DTaP/Tdap/Td series (2 - Td) 6/1/2024 Allergies as of 8/18/2017  Review Complete On: 8/18/2017 By: Anthony Dejesus NP Severity Noted Reaction Type Reactions Sulfa (Sulfonamide Antibiotics) High 10/23/2014   Systemic Hives, Itching Current Immunizations  Reviewed on 2/13/2017 Name Date  
 TB Skin Test (PPD) Intradermal 2/13/2017 Tdap 6/1/2014 Not reviewed this visit You Were Diagnosed With   
  
 Codes Comments Prediabetes    -  Primary ICD-10-CM: R73.03 
ICD-9-CM: 790.29 Vitamin D deficiency     ICD-10-CM: E55.9 ICD-9-CM: 268.9 Snoring     ICD-10-CM: R06.83 
ICD-9-CM: 786.09 Fatigue, unspecified type     ICD-10-CM: R53.83 ICD-9-CM: 780.79 Hypersomnia     ICD-10-CM: G47.10 ICD-9-CM: 780.54 Tinea     ICD-10-CM: B35.9 ICD-9-CM: 110.9 Vitals BP Pulse Temp Resp Height(growth percentile) Weight(growth percentile) 131/90 83 98.2 °F (36.8 °C) (Oral) 16 5' 2\" (1.575 m) 172 lb (78 kg) LMP SpO2 BMI OB Status Smoking Status (Exact Date) 99% 31.46 kg/m2 Hysterectomy Never Smoker Vitals History BMI and BSA Data Body Mass Index Body Surface Area  
 31.46 kg/m 2 1.85 m 2 Preferred Pharmacy Pharmacy Name Phone Corrina Pinedo [address] [facility], [address] [phone] Your Updated Medication List  
  
   
This list is accurate as of: 8/18/17 10:13 AM.  Always use your most recent med list.  
  
  
  
  
 ALPRAZolam 0.5 mg tablet Commonly known as:  Liya Hopes Take 1 Tab by mouth daily as needed for Anxiety. clotrimazole-betamethasone topical cream  
Commonly known as:  Ayde Noe Use thin layer BID for no more than 2 weeks  
  
 diclofenac EC 75 mg EC tablet Commonly known as:  VOLTAREN Take 1 Tab by mouth two (2) times a day. ergocalciferol 50,000 unit capsule Commonly known as:  ERGOCALCIFEROL Take 1 Cap by mouth every seven (7) days. metFORMIN  mg tablet Commonly known as:  GLUCOPHAGE XR Take 1 Tab by mouth daily (with dinner). sertraline 50 mg tablet Commonly known as:  ZOLOFT Take 1 Tab by mouth daily. Prescriptions Sent to Pharmacy Refills  
 clotrimazole-betamethasone (LOTRISONE) topical cream 1 Sig: Use thin layer BID for no more than 2 weeks Class: Normal  
 Pharmacy: [address] #: [phone] We Performed the Following CBC WITH AUTOMATED DIFF [70047 CPT(R)] HEMOGLOBIN A1C WITH EAG [45078 CPT(R)] METABOLIC PANEL, COMPREHENSIVE [84600 CPT(R)] REFERRAL TO SLEEP STUDIES [REF99 Custom] Comments:  
 Please evaluate patient for snoring, fatigue VITAMIN D, 25 HYDROXY [id number] CPT(R)] Follow-up Instructions Return if symptoms worsen or fail to improve. Referral Information Referral ID Referred By Referred To  
  
 [id number] Maximo WEISS MD   
   [address] [place] [address] [address] Phone: [phone] Fax: [phone] Visits Status Start Date End Date 1 New Request 8/18/17 8/18/18 If your referral has a status of pending review or denied, additional information will be sent to support the outcome of this decision. Patient Instructions Ringworm: Care Instructions Your Care Instructions Ringworm is a fungus infection of the skin. It is not caused by a worm. Ringworm causes a round, scaly rash that may crack and itch.  The rash can spread over a wide area. One type of fungus that causes ringworm is often found in locker rooms and swimming pools. It grows well in warm, moist areas of the skin, such as in skin folds. You can get ringworm by sharing towels, clothing, and sports equipment. You can also get it by touching someone who has ringworm. Ringworm is treated with cream that kills the fungus. If the rash is widespread, you may need pills to get rid of it. Ringworm often comes back after treatment. If the rash becomes infected with bacteria, you may need antibiotics. Follow-up care is a key part of your treatment and safety. Be sure to make and go to all appointments, and call your doctor if you are having problems. It's also a good idea to know your test results and keep a list of the medicines you take. How can you care for yourself at home? · Take your medicines exactly as prescribed. Call your doctor if you have any problems with your medicine. · Wash the rash with soap and water, remove flaky skin, and dry thoroughly. · Try an over-the-counter cream with clotrimazole or miconazole in it. Brand names include Lotrimin, Micatin, and Tinactin. Terbinafine cream (Lamisil) is also available without a prescription. Spread the cream beyond the edge or border of the rash. Follow the directions on the package. Do not stop using the medicine just because your skin clears up. You will probably need to continue treatment for 2 to 4 weeks. · To keep from getting another infection: ¨ Do not go barefoot in public places such as gyms or locker rooms. Avoid sharing towels and clothes. Use flip-flops or some other type of shoe in the shower. ¨ Do not wear tight clothes or let your skin stay damp for long periods, such as by staying in a wet bathing suit or sweaty clothes. When should you call for help? Call your doctor now or seek immediate medical care if: 
· You have signs of infection such as: 
¨ Pain, warmth, or swelling in your skin. ¨ Red streaks near a wound in the skin. ¨ Pus coming from the rash on your skin. ¨ A fever. Watch closely for changes in your health, and be sure to contact your doctor if: 
· Your ringworm does not improve after 2 weeks of treatment. · You do not get better as expected. Where can you learn more? Go to http://aidan-faiza.info/. Enter K709 in the search box to learn more about \"Ringworm: Care Instructions. \" Current as of: October 13, 2016 Content Version: 11.3 © 0860-4542 [employer]. Care instructions adapted under license by [employer] (which disclaims liability or warranty for this information). If you have questions about a medical condition or this instruction, always ask your healthcare professional. [address] any warranty or liability for your use of this information. Introducing [facility]! Dear Marilu Burr: 
Thank you for requesting a TRAFI account. Our records indicate that you already have an active TRAFI account. You can access your account anytime at [email or web address] Did you know that you can access your hospital and ER discharge instructions at any time in TRAFI? You can also review all of your test results from your hospital stay or ER visit. Additional Information If you have questions, please visit the Frequently Asked Questions section of the TRAFI website at [email or web address] Remember, TRAFI is NOT to be used for urgent needs. For medical emergencies, dial 911. Now available from your iPhone and Android! Please provide this summary of care documentation to your next provider. Your primary care clinician is listed as Mallory Alejandre. If you have any questions after today's visit, please call [phone].

## 2017-08-19 LAB
25(OH)D3+25(OH)D2 SERPL-MCNC: 29.8 NG/ML (ref 30–100)
ALBUMIN SERPL-MCNC: 4.4 G/DL (ref 3.5–5.5)
ALBUMIN/GLOB SERPL: 1.5 {RATIO} (ref 1.2–2.2)
ALP SERPL-CCNC: 56 IU/L (ref 39–117)
ALT SERPL-CCNC: 14 IU/L (ref 0–32)
AST SERPL-CCNC: 13 IU/L (ref 0–40)
BASOPHILS # BLD AUTO: 0 X10E3/UL (ref 0–0.2)
BASOPHILS NFR BLD AUTO: 1 %
BILIRUB SERPL-MCNC: 0.2 MG/DL (ref 0–1.2)
BUN SERPL-MCNC: 11 MG/DL (ref 6–20)
BUN/CREAT SERPL: 16 (ref 9–23)
CALCIUM SERPL-MCNC: 9.4 MG/DL (ref 8.7–10.2)
CHLORIDE SERPL-SCNC: 101 MMOL/L (ref 96–106)
CO2 SERPL-SCNC: 25 MMOL/L (ref 18–29)
CREAT SERPL-MCNC: 0.68 MG/DL (ref 0.57–1)
EOSINOPHIL # BLD AUTO: 0.1 X10E3/UL (ref 0–0.4)
EOSINOPHIL NFR BLD AUTO: 2 %
ERYTHROCYTE [DISTWIDTH] IN BLOOD BY AUTOMATED COUNT: 15.3 % (ref 12.3–15.4)
EST. AVERAGE GLUCOSE BLD GHB EST-MCNC: 123 MG/DL
GLOBULIN SER CALC-MCNC: 3 G/DL (ref 1.5–4.5)
GLUCOSE SERPL-MCNC: 100 MG/DL (ref 65–99)
HBA1C MFR BLD: 5.9 % (ref 4.8–5.6)
HCT VFR BLD AUTO: 39 % (ref 34–46.6)
HGB BLD-MCNC: 12.1 G/DL (ref 11.1–15.9)
IMM GRANULOCYTES # BLD: 0 X10E3/UL (ref 0–0.1)
IMM GRANULOCYTES NFR BLD: 0 %
LYMPHOCYTES # BLD AUTO: 1.5 X10E3/UL (ref 0.7–3.1)
LYMPHOCYTES NFR BLD AUTO: 25 %
MCH RBC QN AUTO: 25.5 PG (ref 26.6–33)
MCHC RBC AUTO-ENTMCNC: 31 G/DL (ref 31.5–35.7)
MCV RBC AUTO: 82 FL (ref 79–97)
MONOCYTES # BLD AUTO: 0.3 X10E3/UL (ref 0.1–0.9)
MONOCYTES NFR BLD AUTO: 5 %
NEUTROPHILS # BLD AUTO: 4.1 X10E3/UL (ref 1.4–7)
NEUTROPHILS NFR BLD AUTO: 67 %
PLATELET # BLD AUTO: 310 X10E3/UL (ref 150–379)
POTASSIUM SERPL-SCNC: 4.5 MMOL/L (ref 3.5–5.2)
PROT SERPL-MCNC: 7.4 G/DL (ref 6–8.5)
RBC # BLD AUTO: 4.75 X10E6/UL (ref 3.77–5.28)
SODIUM SERPL-SCNC: 142 MMOL/L (ref 134–144)
WBC # BLD AUTO: 6.2 X10E3/UL (ref 3.4–10.8)

## 2017-08-21 RX ORDER — ERGOCALCIFEROL 1.25 MG/1
50000 CAPSULE ORAL
Qty: 4 CAP | Refills: 3 | Status: SHIPPED | OUTPATIENT
Start: 2017-08-21 | End: 2018-07-11 | Stop reason: SDUPTHER

## 2017-09-12 ENCOUNTER — OFFICE VISIT (OUTPATIENT)
Dept: SLEEP MEDICINE | Age: 37
End: 2017-09-12

## 2017-09-12 VITALS
HEIGHT: 62 IN | DIASTOLIC BLOOD PRESSURE: 86 MMHG | WEIGHT: 169.1 LBS | OXYGEN SATURATION: 100 % | SYSTOLIC BLOOD PRESSURE: 117 MMHG | HEART RATE: 100 BPM | BODY MASS INDEX: 31.12 KG/M2

## 2017-09-12 DIAGNOSIS — Z86.59 H/O ANXIETY DISORDER: ICD-10-CM

## 2017-09-12 DIAGNOSIS — G47.33 OSA (OBSTRUCTIVE SLEEP APNEA): Primary | ICD-10-CM

## 2017-09-12 DIAGNOSIS — G47.8 SLEEP PARALYSIS: ICD-10-CM

## 2017-09-12 NOTE — PROGRESS NOTES
[address] [facility] [place]. [place], [address]  Tel.  [phone]  Fax. 100 [facility] 60  [address] [facility] [address], [address]  Tel.  [phone]  Fax. [phone] [address]   Tel.  [phone]  Fax. [phone]         Subjective:      Lion Jackson is an 43 y.o. female referred for evaluation for a sleep disorder. She complains of snoring associated with snorting, periods of not breathing, excessive daytime sleepiness. Symptoms began a few years ago, gradually worsening since that time. She usually can fall asleep in 30 minutes. Family or house members note snoring, periods of not breathing. She does reports of feeling completely or partially paralyzed while waking up. Lion Jackson does wake up frequently at night. She is bothered by waking up too early and left unable to get back to sleep. She actually sleeps about 5 hours at night and wakes up about 3 times during the night. She does work shifts:  First Shift. Crystal indicates she does get too little sleep at night. Her bedtime is 2230. She awakens at 0600. She does take naps. She takes 2 naps a week lasting 1 hour. She has the following observed behaviors: Loud snoring, Light snoring, Pauses in breathing;  . Other remarks: Vivid dreams     [place] Sleepiness Score: 11 which reflect moderate daytime drowsiness. Allergies   Allergen Reactions    Sulfa (Sulfonamide Antibiotics) Hives and Itching         Current Outpatient Prescriptions:     ergocalciferol (ERGOCALCIFEROL) 50,000 unit capsule, Take 1 Cap by mouth every seven (7) days. , Disp: 4 Cap, Rfl: 3    sertraline (ZOLOFT) 50 mg tablet, Take 1 Tab by mouth daily. , Disp: 30 Tab, Rfl: 1    ALPRAZolam (XANAX) 0.5 mg tablet, Take 1 Tab by mouth daily as needed for Anxiety. , Disp: 30 Tab, Rfl: 0    clotrimazole-betamethasone (LOTRISONE) topical cream, Use thin layer BID for no more than 2 weeks, Disp: 15 g, Rfl: 1    metFORMIN ER (GLUCOPHAGE XR) 500 mg tablet, Take 1 Tab by mouth daily (with dinner). , Disp: 30 Tab, Rfl: 2    diclofenac EC (VOLTAREN) 75 mg EC tablet, Take 1 Tab by mouth two (2) times a day., Disp: 60 Tab, Rfl: 0     She  has a past medical history of Ill-defined condition and Psychiatric disorder. She  has a past surgical history that includes  section; hysterectomy (10/31/2014); and  section. She family history includes Diabetes in her father; Elevated Lipids in her mother; Heart Disease in her father; Hypertension in her mother. She  reports that she has never smoked. She has never used smokeless tobacco. She reports that she drinks alcohol. She reports that she does not use illicit drugs. Review of Systems:  Constitutional:  No significant weight loss or weight gain  Eyes:  No blurred vision  CVS:  No significant chest pain  Pulm:  No significant shortness of breath  GI:  No significant nausea or vomiting  :  No significant nocturia  Musculoskeletal:  No significant joint pain at night  Skin:  No significant rashes  Neuro:  No significant dizziness   Psych:  No active mood issues    Sleep Review of Systems: notable for no difficulty falling asleep; frequent awakenings at night;  regular dreaming noted; no nightmares ; early morning headaches; no memory problems;  concentration issues; no history of any automobile or occupational accidents due to daytime drowsiness.       Objective:     Visit Vitals    /86    Pulse 100    Ht 5' 2\" (1.575 m)    Wt 169 lb 1.6 oz (76.7 kg)    LMP  (Exact Date)    SpO2 100%    BMI 30.93 kg/m2         General:   Not in acute distress   Eyes:  Anicteric sclerae, no obvious strabismus   Nose:  No obvious nasal septum deviation    Oropharynx:   Class 4 oropharyngeal outlet, thick tongue base, uvula could not be seen due to low-lying soft palate, narrow tonsilo-pharyngeal pilars   Tonsils:   tonsils are not seen due to low-lying soft palate   Neck:   Neck circ. in \"inches\": 14.5; midline trachea   Chest/Lungs:  Equal lung expansion, clear on auscultation    CVS:  Normal rate, regular rhythm; no JVD   Skin:  Warm to touch; no obvious rashes   Neuro:  No focal deficits ; no obvious tremor    Psych:  Normal affect,  normal countenance;          Assessment:       ICD-10-CM ICD-9-CM    1. OTONIEL (obstructive sleep apnea) G47.33 327.23 POLYSOMNOGRAPHY 1 NIGHT   2. H/O anxiety disorder Z86.59 V11.8    3. BMI 30.0-30.9,adult Z68.30 V85.30    4. Sleep paralysis G47.8 780.58          Plan:     * The patient currently has a Low Risk for having sleep apnea. STOP-BANG score 3.  * Sleep testing was ordered for initial evaluation. * She was provided information on sleep apnea including coresponding risk factors and the importance of proper treatment. * Treatment options if indicated were reviewed today. Patient agrees to a trial of PAP therapy if indicated. * Counseling was provided regarding proper sleep hygiene (including effect of light on sleep) and safe driving. * Effect of sleep disturbance on weight was reviewed. We have recommended a dedicated weight loss through appropriate diet and an exercise regiment as significant weight reduction has been shown to reduce severity of obstructive sleep apnea. * Patient agrees to telephone [phone]  follow-up by myself or lead sleep technologist shortly after sleep study to review results and plan final management.     (patient has given permission for a message to be left regarding test results and further management if patient cannot be cannot be reached directly). Thank you for allowing us to participate in your patient's medical care. We'll keep you updated on these investigations. Tracy Mai MD, FAASM  Electronically signed.  09/12/17

## 2017-09-18 ENCOUNTER — APPOINTMENT (OUTPATIENT)
Dept: CT IMAGING | Age: 37
End: 2017-09-18
Attending: EMERGENCY MEDICINE
Payer: COMMERCIAL

## 2017-09-18 ENCOUNTER — APPOINTMENT (OUTPATIENT)
Dept: GENERAL RADIOLOGY | Age: 37
End: 2017-09-18
Attending: EMERGENCY MEDICINE
Payer: COMMERCIAL

## 2017-09-18 ENCOUNTER — HOSPITAL ENCOUNTER (EMERGENCY)
Age: 37
Discharge: HOME | End: 2017-09-18
Attending: EMERGENCY MEDICINE
Payer: COMMERCIAL

## 2017-09-18 VITALS
OXYGEN SATURATION: 100 % | SYSTOLIC BLOOD PRESSURE: 148 MMHG | BODY MASS INDEX: 31.1 KG/M2 | HEART RATE: 91 BPM | DIASTOLIC BLOOD PRESSURE: 92 MMHG | RESPIRATION RATE: 16 BRPM | WEIGHT: 169 LBS | TEMPERATURE: 98 F | HEIGHT: 62 IN

## 2017-09-18 DIAGNOSIS — S16.1XXA NECK STRAIN, INITIAL ENCOUNTER: ICD-10-CM
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
## 2017-09-18 LAB — HCG UR QL: NEGATIVE

## 2017-09-18 PROCEDURE — 70450 CT HEAD/BRAIN W/O DYE: CPT

## 2017-09-18 PROCEDURE — 81025 URINE PREGNANCY TEST: CPT

## 2017-09-18 PROCEDURE — 72050 X-RAY EXAM NECK SPINE 4/5VWS: CPT

## 2017-09-18 PROCEDURE — 72100 X-RAY EXAM L-S SPINE 2/3 VWS: CPT

## 2017-09-18 PROCEDURE — 99283 EMERGENCY DEPT VISIT LOW MDM: CPT

## 2017-09-18 RX ORDER — HYDROCODONE BITARTRATE AND ACETAMINOPHEN 5; 325 MG/1; MG/1
1 TABLET ORAL
Qty: 20 TAB | Refills: 0 | Status: SHIPPED | OUTPATIENT
Start: 2017-09-18 | End: 2017-12-01

## 2017-09-18 NOTE — ED NOTES
Bedside and Verbal shift change report given to [address] [place] [id number] (oncoming nurse) by Mendel Grimes (offgoing nurse). Report included the following information SBAR and ED Summary.
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
## 2017-09-18 NOTE — LETTER
NOTIFICATION RETURN TO WORK / SCHOOL 
 
9/18/2017 8:58 PM 
 
Ms. [address] [facility] [address] [address] To Whom It May Concern: 
 
Narciso Aguirre is currently under the care of [facility] EMERGENCY DEPT. She will return to work/school on: Wednesday September 20,2017 If there are questions or concerns please have the patient contact our office. Sincerely, No name on file.

## 2017-09-19 NOTE — DISCHARGE INSTRUCTIONS
Neck Strain: Care Instructions  Your Care Instructions  You have strained the muscles and ligaments in your neck. A sudden, awkward movement can strain the neck. This often occurs with falls or car accidents or during certain sports. Everyday activities like working on a computer or sleeping can also cause neck strain if they force you to hold your neck in an awkward position for a long time. It is common for neck pain to get worse for a day or two after an injury, but it should start to feel better after that. You may have more pain and stiffness for several days before it gets better. This is expected. It may take a few weeks or longer for it to heal completely. Good home treatment can help you get better faster and avoid future neck problems. Follow-up care is a key part of your treatment and safety. Be sure to make and go to all appointments, and call your doctor if you are having problems. It's also a good idea to know your test results and keep a list of the medicines you take. How can you care for yourself at home? · If you were given a neck brace (cervical collar) to limit neck motion, wear it as instructed for as many days as your doctor tells you to. Do not wear it longer than you were told to. Wearing a brace for too long can make neck stiffness worse and weaken the neck muscles. · You can try using heat or ice to see if it helps. ¨ Try using a heating pad on a low or medium setting for 15 to 20 minutes every 2 to 3 hours. Try a warm shower in place of one session with the heating pad. You can also buy single-use heat wraps that last up to 8 hours. ¨ You can also try an ice pack for 10 to 15 minutes every 2 to 3 hours. · Take pain medicines exactly as directed. ¨ If the doctor gave you a prescription medicine for pain, take it as prescribed. ¨ If you are not taking a prescription pain medicine, ask your doctor if you can take an over-the-counter medicine.   · Gently rub the area to relieve pain and help with blood flow. Do not massage the area if it hurts to do so. · Do not do anything that makes the pain worse. Take it easy for a couple of days. You can do your usual activities if they do not hurt your neck or put it at risk for more stress or injury. · Try sleeping on a special neck pillow. Place it under your neck, not under your head. Placing a tightly rolled-up towel under your neck while you sleep will also work. If you use a neck pillow or rolled towel, do not use your regular pillow at the same time. · To prevent future neck pain, do exercises to stretch and strengthen your neck and back. Learn how to use good posture, safe lifting techniques, and proper body mechanics. When should you call for help? Call 911 anytime you think you may need emergency care. For example, call if:  · You are unable to move an arm or a leg at all. Call your doctor now or seek immediate medical care if:  · You have new or worse symptoms in your arms, legs, chest, belly, or buttocks. Symptoms may include:  ¨ Numbness or tingling. ¨ Weakness. ¨ Pain. · You lose bladder or bowel control. Watch closely for changes in your health, and be sure to contact your doctor if:  · You are not getting better as expected. Where can you learn more? Go to http://aidan-faiza.info/. Enter M253 in the search box to learn more about \"Neck Strain: Care Instructions. \"  Current as of: March 21, 2017  Content Version: 11.3  © 5734-0573 [employer]. Care instructions adapted under license by [employer] (which disclaims liability or warranty for this information). If you have questions about a medical condition or this instruction, always ask your healthcare professional. [address] any warranty or liability for your use of this information.

## 2017-10-12 ENCOUNTER — HOSPITAL ENCOUNTER (OUTPATIENT)
Dept: SLEEP MEDICINE | Age: 37
Discharge: HOME | End: 2017-10-12
Payer: COMMERCIAL

## 2017-10-12 DIAGNOSIS — G47.33 OSA (OBSTRUCTIVE SLEEP APNEA): ICD-10-CM

## 2017-10-12 PROCEDURE — 95810 POLYSOM 6/> YRS 4/> PARAM: CPT | Performed by: INTERNAL MEDICINE

## 2017-10-13 ENCOUNTER — TELEPHONE (OUTPATIENT)
Dept: SLEEP MEDICINE | Age: 37
End: 2017-10-13

## 2017-10-13 DIAGNOSIS — G47.419 NARCOLEPSY WITHOUT CATAPLEXY: ICD-10-CM

## 2017-10-13 DIAGNOSIS — G47.419 NARCOLEPSY WITHOUT CATAPLEXY: Primary | ICD-10-CM

## 2017-10-13 NOTE — TELEPHONE ENCOUNTER
Patient is to be contacted by lead sleep technologist regarding results of Sleep Testing which was indicative of an average AHI of 0.5 per hour with an SpO2 eric of 94% and SpO2 of < 88% being 0.0 minutes. Attended night and day testing is recommended to assess the complaint of excessive daytime sleepiness reported by the patient at the time of initial visit and as implied by an [place] Sleepiness Score of 11. Encounter Diagnosis   Name Primary?     Narcolepsy without cataplexy Yes       Orders Placed This Encounter    MULTIPLE SLEEP LATENCY TEST     Standing Status:   Future     Standing Expiration Date:   4/11/2018    7-DRUG SCREEN, UR     Standing Status:   Future     Standing Expiration Date:   4/11/2018    POLYSOMNOGRAPHY 1 NIGHT     Standing Status:   Future     Standing Expiration Date:   4/12/2018     Order Specific Question:   Reason for Exam     Answer:   Narcolepsy

## 2017-10-16 ENCOUNTER — DOCUMENTATION ONLY (OUTPATIENT)
Dept: SLEEP MEDICINE | Age: 37
End: 2017-10-16

## 2017-10-16 NOTE — PROGRESS NOTES
This note is being routed to [facility]. Sleep Medicine consult note and sleep study report in patient's chart for review.     Thank you for the referral.

## 2017-10-17 ENCOUNTER — TELEPHONE (OUTPATIENT)
Dept: SLEEP MEDICINE | Age: 37
End: 2017-10-17

## 2017-10-17 NOTE — TELEPHONE ENCOUNTER
Reviewed sleep study results with patient. She expressed understanding and is willing to proceed with a PSG / MSLT.

## 2017-11-13 ENCOUNTER — HOSPITAL ENCOUNTER (OUTPATIENT)
Dept: SLEEP MEDICINE | Age: 37
Discharge: HOME | End: 2017-11-13
Payer: COMMERCIAL

## 2017-11-13 VITALS
SYSTOLIC BLOOD PRESSURE: 124 MMHG | TEMPERATURE: 97.9 F | HEART RATE: 102 BPM | WEIGHT: 171.9 LBS | DIASTOLIC BLOOD PRESSURE: 87 MMHG | BODY MASS INDEX: 31.63 KG/M2 | HEIGHT: 62 IN

## 2017-11-13 DIAGNOSIS — G47.419 NARCOLEPSY WITHOUT CATAPLEXY: ICD-10-CM

## 2017-11-13 PROCEDURE — 95810 POLYSOM 6/> YRS 4/> PARAM: CPT | Performed by: INTERNAL MEDICINE

## 2017-11-14 ENCOUNTER — HOSPITAL ENCOUNTER (OUTPATIENT)
Dept: SLEEP MEDICINE | Age: 37
Discharge: HOME | End: 2017-11-14
Payer: COMMERCIAL

## 2017-11-14 ENCOUNTER — TELEPHONE (OUTPATIENT)
Dept: SLEEP MEDICINE | Age: 37
End: 2017-11-14

## 2017-11-14 DIAGNOSIS — G47.419 NARCOLEPSY WITHOUT CATAPLEXY: ICD-10-CM

## 2017-11-14 PROCEDURE — 95805 MULTIPLE SLEEP LATENCY TEST: CPT | Performed by: INTERNAL MEDICINE

## 2017-11-15 LAB
AMPHETAMINES UR QL SCN: NEGATIVE NG/ML
BARBITURATES UR QL SCN: NEGATIVE NG/ML
BENZODIAZ UR QL: NEGATIVE NG/ML
BZE UR QL: NEGATIVE NG/ML
CANNABINOIDS UR QL SCN: NEGATIVE NG/ML
DRUG SCREEN COMMENT:, 753798: NORMAL
OPIATES UR QL: NEGATIVE NG/ML
PCP UR QL: NEGATIVE NG/ML

## 2017-11-20 ENCOUNTER — TELEPHONE (OUTPATIENT)
Dept: SLEEP MEDICINE | Age: 37
End: 2017-11-20

## 2017-12-01 ENCOUNTER — TELEPHONE (OUTPATIENT)
Dept: FAMILY MEDICINE CLINIC | Age: 37
End: 2017-12-01

## 2017-12-01 ENCOUNTER — OFFICE VISIT (OUTPATIENT)
Dept: FAMILY MEDICINE CLINIC | Age: 37
End: 2017-12-01

## 2017-12-01 VITALS
HEIGHT: 62 IN | SYSTOLIC BLOOD PRESSURE: 136 MMHG | WEIGHT: 172 LBS | TEMPERATURE: 98.8 F | RESPIRATION RATE: 18 BRPM | BODY MASS INDEX: 31.65 KG/M2 | HEART RATE: 99 BPM | DIASTOLIC BLOOD PRESSURE: 88 MMHG

## 2017-12-01 DIAGNOSIS — F41.9 ANXIETY: ICD-10-CM

## 2017-12-01 DIAGNOSIS — G47.10 HYPERSOMNIA: Primary | ICD-10-CM

## 2017-12-01 RX ORDER — MODAFINIL 100 MG/1
100 TABLET ORAL
Qty: 30 TAB | Refills: 0 | Status: SHIPPED | OUTPATIENT
Start: 2017-12-01 | End: 2017-12-21

## 2017-12-01 RX ORDER — ALPRAZOLAM 0.5 MG/1
0.5 TABLET ORAL
Qty: 30 TAB | Refills: 0 | Status: SHIPPED | OUTPATIENT
Start: 2017-12-01

## 2017-12-01 NOTE — PATIENT INSTRUCTIONS
Modafinil (By mouth)   Modafinil ([id number])  Treats narcolepsy, sleep apnea, and shift work sleep disorder. Brand Name(s): Provigil   There may be other brand names for this medicine. When This Medicine Should Not Be Used: You should not use this medicine if you have had an allergic reaction to modafinil or other similar medicines (such as armodafinil, Nuvigil®). How to Use This Medicine:   Tablet  · Your doctor will tell you how much medicine to use. Do not use more than directed. · If you use this medicine for daytime wakefulness, take it in the morning. If you use it to stay awake during shift work, take the medicine 1 hour before you begin working. · You may take this medicine with or without food. · If you have sleep apnea and use a CPAP machine at night, continue using this machine with the medicine. · This medicine may not work as well if you use it during a time when you are unusually sleepy. · Read and follow the patient instructions that come with this medicine. Talk to your doctor or pharmacist if you have any questions. · This medicine should come with a Medication Guide. Ask your pharmacist for a copy if you do not have one. If a dose is missed:   · Take a dose as soon as you remember. If it is almost time for your next dose, wait until then and take a regular dose. Do not take extra medicine to make up for a missed dose. · If you have missed your dose by more than half a day, skip the missed dose so you will not be kept awake during your normal sleeping hours. How to Store and Dispose of This Medicine:   · Store the medicine in a closed container at room temperature, away from heat, moisture, and direct light. · Ask your pharmacist, doctor, or health caregiver about the best way to dispose of any outdated medicine or medicine no longer needed. · Keep all medicine out of the reach of children. Never share your medicine with anyone.   Drugs and Foods to Avoid:   Ask your doctor or pharmacist before using any other medicine, including over-the-counter medicines, vitamins, and herbal products. · Make sure your doctor knows if you are also using dextroamphetamine (Jefferson Mantel), itraconazole (Sporanox®), ketoconazole (Cindy Savory), methylphenidate (Ritalin®), or rifampin (Rifadin®, Rimactane®). Tell your doctor if you are using blood thinners (such as warfarin, Coumadin®) or an MAO inhibitor (such as Eldepryl®, Marplan®, Nardil®, or Parnate®). · Your doctor should know if you are also using medicine for depression (such as clomipramine, desipramine, Anafranil®, or Norpramin®) or medicine for seizures (such as carbamazepine, phenobarbital, or Tegretol®). · Talk to your doctor if you are also using birth control pills (such as ethinyl estradiol), cyclosporine (Gengraf®, Neoral®, Sandimmune®), diazepam (Valium®), phenytoin (Dilantin®), propranolol (Inderal®), or triazolam (Halcion®). · Do not drink alcohol while you are using this medicine. Warnings While Using This Medicine:   · It is important to tell your doctor if you become pregnant. Your doctor may want you to join a pregnancy registry for patients taking this medicine. · Make sure your doctor knows if you have kidney disease, liver disease, heart disease, heart rhythm problems, high blood pressure, or have recently had chest pain or a heart attack. Tell your doctor if you have a history of mental illness or drug abuse. · Serious skin reactions can occur with this medicine. Stop using this medicine and check with your doctor right away if you have blistering, peeling, or loosening of the skin; red skin lesions; severe acne or skin rash; sores or ulcers on the skin; or fever or chills while you are using this medicine. · This medicine may cause a serious type of allergic reaction called anaphylaxis. Anaphylaxis can be life-threatening and requires immediate medical attention.  Stop taking this medicine and call your doctor right away if you have a skin rash, itching, hives, hoarseness, trouble breathing, trouble swallowing, or any swelling of your hands, face, or mouth while you are using this medicine. · This medicine may cause serious allergic reactions affecting multiple body organs (e.g., heart, liver, or blood cells). Stop using this medicine and check with your doctor right away if you have the following symptoms: chest pain or discomfort, fever and chills, dark urine, headache, rash, stomach pain, unusual tiredness, unusual bleeding or bruising, or yellow eyes or skin. · Birth control pills, implants, shots, patches, vaginal rings, or an IUD may not work well while you are using this medicine. To keep from getting pregnant, use another form of birth control while you are using this medicine and for one month after your last dose. Other forms of birth control include condoms, diaphragms, or contraceptive foams or jellies. · This medicine may make you dizzy, drowsy, or you may have trouble thinking or seeing clearly. Avoid driving, using machines, or doing anything else that could be dangerous if you are not alert. · This medicine is not for use with occasional sleepiness that has not been diagnosed as caused by narcolepsy, sleep apnea, or shift-work sleep disturbance. · This medicine can be habit-forming. Do not use more than your prescribed dose. Call your doctor if you think your medicine is not working. · Your doctor will check your progress and the effects of this medicine at regular visits. Keep all appointments. Your blood pressure may need to be checked more often while taking this medicine. Possible Side Effects While Using This Medicine:   Call your doctor right away if you notice any of these side effects:  · Allergic reaction: Itching or hives, swelling in your face or hands, swelling or tingling in your mouth or throat, chest tightness, trouble breathing  · Blistering, peeling, or red skin rash.   · Chest pain.  · Fast, slow, pounding, or uneven heartbeat. · Feeling unusually agitated, aggressive, confused, or excited. · Fever, chills, cough, sore throat, and body aches. · Mood or mental changes. · Numbness, tingling, or burning pain in your hands, arms, legs, or feet. · Seeing, hearing, or feeling things that are not there. · Severe muscle weakness. · Severe nausea, vomiting, or diarrhea. · Thoughts of hurting yourself and others. · Tremors or shaking. · Trouble with breathing. · Unusual bleeding, bruising, or weakness. · Unusual thoughts or behavior. If you notice these less serious side effects, talk with your doctor:   · Anxiety, nervousness, or trouble sleeping. · Back pain. · Dry mouth. · Headache or dizziness. · Mild nausea, diarrhea, upset stomach, or loss of appetite. · Mild skin rash or itching. · Painful menstrual periods. · Runny or stuffy nose. If you notice other side effects that you think are caused by this medicine, tell your doctor. Call your doctor for medical advice about side effects. You may report side effects to FDA at 0-342-FDA-2212  © 2017 [facility] Information is for End User's use only and may not be sold, redistributed or otherwise used for commercial purposes. The above information is an  only. It is not intended as medical advice for individual conditions or treatments. Talk to your doctor, nurse or pharmacist before following any medical regimen to see if it is safe and effective for you.

## 2017-12-01 NOTE — TELEPHONE ENCOUNTER
[employer] better health form for modafinil completed and all pertinent information attached. Placed on [employer] desk for review and signature.

## 2017-12-01 NOTE — MR AVS SNAPSHOT
Visit Information Date & Time Provider Department Dept. Phone Encounter #  
 12/1/2017  7:00 AM Anabel Domingeuz NP [address] [facility] [phone] [id number] Follow-up Instructions Return in about 1 month (around 1/1/2018) for follow up on medication. Upcoming Health Maintenance Date Due Influenza Age 5 to Adult 8/1/2017 PAP AKA CERVICAL CYTOLOGY 6/17/2018 DTaP/Tdap/Td series (2 - Td) 6/1/2024 Allergies as of 12/1/2017  Review Complete On: 12/1/2017 By: Anabel Dominguez NP Severity Noted Reaction Type Reactions Sulfa (Sulfonamide Antibiotics) High 10/23/2014   Systemic Hives, Itching Current Immunizations  Reviewed on 2/13/2017 Name Date  
 TB Skin Test (PPD) Intradermal 2/13/2017 Tdap 6/1/2014 Not reviewed this visit You Were Diagnosed With   
  
 Codes Comments Hypersomnia    -  Primary ICD-10-CM: G47.10 ICD-9-CM: 780.54 Anxiety     ICD-10-CM: F41.9 ICD-9-CM: 300.00 Vitals BP Pulse Temp Resp Height(growth percentile) Weight(growth percentile) 136/88 (BP 1 Location: Right arm, BP Patient Position: Sitting) 99 98.8 °F (37.1 °C) (Oral) 18 5' 2\" (1.575 m) 172 lb (78 kg) LMP BMI OB Status Smoking Status (Exact Date) 31.46 kg/m2 Hysterectomy Never Smoker Vitals History BMI and BSA Data Body Mass Index Body Surface Area  
 31.46 kg/m 2 1.85 m 2 Preferred Pharmacy Pharmacy Name Phone [facility] [address] [place] [address], [address] [place] [address] [phone] Your Updated Medication List  
  
   
This list is accurate as of: 12/1/17  7:30 AM.  Always use your most recent med list.  
  
  
  
  
 ALPRAZolam 0.5 mg tablet Commonly known as:  Won Asaf Take 1 Tab by mouth daily as needed for Anxiety.  
  
 ergocalciferol 50,000 unit capsule Commonly known as:  ERGOCALCIFEROL Take 1 Cap by mouth every seven (7) days. modafinil 100 mg tablet Commonly known as:  PROVIGIL  
 Take 1 Tab by mouth every morning. Max Daily Amount: 100 mg.  
  
 sertraline 50 mg tablet Commonly known as:  ZOLOFT Take 1 Tab by mouth daily. Prescriptions Printed Refills  
 modafinil (PROVIGIL) 100 mg tablet 0 Sig: Take 1 Tab by mouth every morning. Max Daily Amount: 100 mg. Class: Print Route: Oral  
 ALPRAZolam (XANAX) 0.5 mg tablet 0 Sig: Take 1 Tab by mouth daily as needed for Anxiety. Class: Print Route: Oral  
  
Follow-up Instructions Return in about 1 month (around 1/1/2018) for follow up on medication. Patient Instructions Modafinil (By mouth) Modafinil ([id number]) Treats narcolepsy, sleep apnea, and shift work sleep disorder. Brand Name(s): Provigil There may be other brand names for this medicine. When This Medicine Should Not Be Used: You should not use this medicine if you have had an allergic reaction to modafinil or other similar medicines (such as armodafinil, Nuvigil®). How to Use This Medicine:  
Tablet · Your doctor will tell you how much medicine to use. Do not use more than directed. · If you use this medicine for daytime wakefulness, take it in the morning. If you use it to stay awake during shift work, take the medicine 1 hour before you begin working. · You may take this medicine with or without food. · If you have sleep apnea and use a CPAP machine at night, continue using this machine with the medicine. · This medicine may not work as well if you use it during a time when you are unusually sleepy. · Read and follow the patient instructions that come with this medicine. Talk to your doctor or pharmacist if you have any questions. · This medicine should come with a Medication Guide. Ask your pharmacist for a copy if you do not have one. If a dose is missed: · Take a dose as soon as you remember. If it is almost time for your next dose, wait until then and take a regular dose.  Do not take extra medicine to make up for a missed dose. · If you have missed your dose by more than half a day, skip the missed dose so you will not be kept awake during your normal sleeping hours. How to Store and Dispose of This Medicine: · Store the medicine in a closed container at room temperature, away from heat, moisture, and direct light. · Ask your pharmacist, doctor, or health caregiver about the best way to dispose of any outdated medicine or medicine no longer needed. · Keep all medicine out of the reach of children. Never share your medicine with anyone. Drugs and Foods to Avoid: Ask your doctor or pharmacist before using any other medicine, including over-the-counter medicines, vitamins, and herbal products. · Make sure your doctor knows if you are also using dextroamphetamine (Blanca Millard), itraconazole (Sporanox®), ketoconazole (Deric Lernerbe), methylphenidate (Ritalin®), or rifampin (Rifadin®, Rimactane®). Tell your doctor if you are using blood thinners (such as warfarin, Coumadin®) or an MAO inhibitor (such as Eldepryl®, Marplan®, Nardil®, or Parnate®). · Your doctor should know if you are also using medicine for depression (such as clomipramine, desipramine, Anafranil®, or Norpramin®) or medicine for seizures (such as carbamazepine, phenobarbital, or Tegretol®). · Talk to your doctor if you are also using birth control pills (such as ethinyl estradiol), cyclosporine (Gengraf®, Neoral®, Sandimmune®), diazepam (Valium®), phenytoin (Dilantin®), propranolol (Inderal®), or triazolam (Halcion®). · Do not drink alcohol while you are using this medicine. Warnings While Using This Medicine: · It is important to tell your doctor if you become pregnant. Your doctor may want you to join a pregnancy registry for patients taking this medicine.  
· Make sure your doctor knows if you have kidney disease, liver disease, heart disease, heart rhythm problems, high blood pressure, or have recently had chest pain or a heart attack. Tell your doctor if you have a history of mental illness or drug abuse. · Serious skin reactions can occur with this medicine. Stop using this medicine and check with your doctor right away if you have blistering, peeling, or loosening of the skin; red skin lesions; severe acne or skin rash; sores or ulcers on the skin; or fever or chills while you are using this medicine. · This medicine may cause a serious type of allergic reaction called anaphylaxis. Anaphylaxis can be life-threatening and requires immediate medical attention. Stop taking this medicine and call your doctor right away if you have a skin rash, itching, hives, hoarseness, trouble breathing, trouble swallowing, or any swelling of your hands, face, or mouth while you are using this medicine. · This medicine may cause serious allergic reactions affecting multiple body organs (e.g., heart, liver, or blood cells). Stop using this medicine and check with your doctor right away if you have the following symptoms: chest pain or discomfort, fever and chills, dark urine, headache, rash, stomach pain, unusual tiredness, unusual bleeding or bruising, or yellow eyes or skin. · Birth control pills, implants, shots, patches, vaginal rings, or an IUD may not work well while you are using this medicine. To keep from getting pregnant, use another form of birth control while you are using this medicine and for one month after your last dose. Other forms of birth control include condoms, diaphragms, or contraceptive foams or jellies. · This medicine may make you dizzy, drowsy, or you may have trouble thinking or seeing clearly. Avoid driving, using machines, or doing anything else that could be dangerous if you are not alert. · This medicine is not for use with occasional sleepiness that has not been diagnosed as caused by narcolepsy, sleep apnea, or shift-work sleep disturbance. · This medicine can be habit-forming. Do not use more than your prescribed dose. Call your doctor if you think your medicine is not working. · Your doctor will check your progress and the effects of this medicine at regular visits. Keep all appointments. Your blood pressure may need to be checked more often while taking this medicine. Possible Side Effects While Using This Medicine:  
Call your doctor right away if you notice any of these side effects: · Allergic reaction: Itching or hives, swelling in your face or hands, swelling or tingling in your mouth or throat, chest tightness, trouble breathing · Blistering, peeling, or red skin rash. · Chest pain. · Fast, slow, pounding, or uneven heartbeat. · Feeling unusually agitated, aggressive, confused, or excited. · Fever, chills, cough, sore throat, and body aches. · Mood or mental changes. · Numbness, tingling, or burning pain in your hands, arms, legs, or feet. · Seeing, hearing, or feeling things that are not there. · Severe muscle weakness. · Severe nausea, vomiting, or diarrhea. · Thoughts of hurting yourself and others. · Tremors or shaking. · Trouble with breathing. · Unusual bleeding, bruising, or weakness. · Unusual thoughts or behavior. If you notice these less serious side effects, talk with your doctor: · Anxiety, nervousness, or trouble sleeping. · Back pain. · Dry mouth. · Headache or dizziness. · Mild nausea, diarrhea, upset stomach, or loss of appetite. · Mild skin rash or itching. · Painful menstrual periods. · Runny or stuffy nose. If you notice other side effects that you think are caused by this medicine, tell your doctor. Call your doctor for medical advice about side effects. You may report side effects to FDA at 6-095-LXV-2711 © 2017 [facility] Information is for End User's use only and may not be sold, redistributed or otherwise used for commercial purposes. The above information is an  only. It is not intended as medical advice for individual conditions or treatments. Talk to your doctor, nurse or pharmacist before following any medical regimen to see if it is safe and effective for you. Introducing [facility]! Dear Marco A Connor: 
Thank you for requesting a [employer] account. Our records indicate that you already have an active [employer]uku[employer] account. You can access your account anytime at [email or web address] Did you know that you can access your hospital and ER discharge instructions at any time in [employer]ukupe? You can also review all of your test results from your hospital stay or ER visit. Additional Information If you have questions, please visit the Frequently Asked Questions section of the B[employer]pe website at [email or web address] Remember, Bukupe is NOT to be used for urgent needs. For medical emergencies, dial 911. Now available from your iPhone and Android! Please provide this summary of care documentation to your next provider. Your primary care clinician is listed as Mallory Alejandre. If you have any questions after today's visit, please call [phone].

## 2017-12-01 NOTE — PROGRESS NOTES
Chief Complaint   Patient presents with    Results     sleep study     Patient in office today to discuss sleep study results. To confirm know labs are needed before starting medication regimen for excessive daytime sleepiness. Pt received sleep study and was diagnosed with hypersomnia and is interested in trying medication for this. Pt has a history of anxiety disorder. Has previously been prescribed zoloft for this but has hard time remembering to take medication. Resumed taking this within the last few weeks. Pt also takes xanax as needed for more acute sx of anxiety. Requesting a refill of her xanax. Denies any other concerns at this time. Chief Complaint   Patient presents with    Results     sleep study     she is a 42y.o. year old female who presents for evalution. Reviewed PmHx, RxHx, FmHx, SocHx, AllgHx and updated and dated in the chart. Review of Systems - negative except as listed above in the HPI    Objective:     Vitals:    12/01/17 0709   BP: 136/88   Pulse: 99   Resp: 18   Temp: 98.8 °F (37.1 °C)   TempSrc: Oral   Weight: 172 lb (78 kg)   Height: 5' 2\" (1.575 m)     Physical Examination: General appearance - alert, well appearing, and in no distress  Mental status - normal mood, behavior, speech, dress, motor activity, and thought processes  Eyes - pupils equal and reactive, extraocular eye movements intact  Ears - bilateral TM's and external ear canals normal  Nose - normal and patent, no erythema, discharge or polyps and normal nontender sinuses  Mouth - mucous membranes moist, pharynx normal without lesions  Neck - supple, no significant adenopathy  Chest - clear to auscultation, no wheezes, rales or rhonchi, symmetric air entry  Heart - normal rate, regular rhythm, normal S1, S2, no murmurs    Assessment/ Plan:   Diagnoses and all orders for this visit:    1. Hypersomnia  -     modafinil (PROVIGIL) 100 mg tablet; Take 1 Tab by mouth every morning.  Max Daily Amount: 100 mg. Start provigil daily. Reviewed SEs/ADRs of medication. Enc pt to follow up in 1 month for med check. 2. Anxiety  -     ALPRAZolam (XANAX) 0.5 mg tablet; Take 1 Tab by mouth daily as needed for Anxiety. Discussed risk that provigil could worsen anxiety. Enc to continue taking zoloft daily and continue using xanax sparingly as needed. If increasing frequency of use of xanax, may need to adjust daily med. Follow-up Disposition:  Return in about 1 month (around 1/1/2018) for follow up on medication. I have discussed the diagnosis with the patient and the intended plan as seen in the above orders. The patient has received an after-visit summary and questions were answered concerning future plans. Medication Side Effects and Warnings were discussed with patient: yes  Patient Labs were reviewed and or requested: yes  Patient Past Records were reviewed and or requested  yes  Patient / Caregiver Understanding of treatment plan was verbalized during office visit YES    FRANK Louis    Patient Instructions   Modafinil (By mouth)   Modafinil (fgo-DOP-w-nil)  Treats narcolepsy, sleep apnea, and shift work sleep disorder. Brand Name(s): Provigil   There may be other brand names for this medicine. When This Medicine Should Not Be Used: You should not use this medicine if you have had an allergic reaction to modafinil or other similar medicines (such as armodafinil, Nuvigil®). How to Use This Medicine:   Tablet  · Your doctor will tell you how much medicine to use. Do not use more than directed. · If you use this medicine for daytime wakefulness, take it in the morning. If you use it to stay awake during shift work, take the medicine 1 hour before you begin working. · You may take this medicine with or without food. · If you have sleep apnea and use a CPAP machine at night, continue using this machine with the medicine.   · This medicine may not work as well if you use it during a time when you are unusually sleepy. · Read and follow the patient instructions that come with this medicine. Talk to your doctor or pharmacist if you have any questions. · This medicine should come with a Medication Guide. Ask your pharmacist for a copy if you do not have one. If a dose is missed:   · Take a dose as soon as you remember. If it is almost time for your next dose, wait until then and take a regular dose. Do not take extra medicine to make up for a missed dose. · If you have missed your dose by more than half a day, skip the missed dose so you will not be kept awake during your normal sleeping hours. How to Store and Dispose of This Medicine:   · Store the medicine in a closed container at room temperature, away from heat, moisture, and direct light. · Ask your pharmacist, doctor, or health caregiver about the best way to dispose of any outdated medicine or medicine no longer needed. · Keep all medicine out of the reach of children. Never share your medicine with anyone. Drugs and Foods to Avoid:   Ask your doctor or pharmacist before using any other medicine, including over-the-counter medicines, vitamins, and herbal products. · Make sure your doctor knows if you are also using dextroamphetamine (Delbra Merry), itraconazole (Sporanox®), ketoconazole (Ilona Gallop), methylphenidate (Ritalin®), or rifampin (Rifadin®, Rimactane®). Tell your doctor if you are using blood thinners (such as warfarin, Coumadin®) or an MAO inhibitor (such as Eldepryl®, Marplan®, Nardil®, or Parnate®). · Your doctor should know if you are also using medicine for depression (such as clomipramine, desipramine, Anafranil®, or Norpramin®) or medicine for seizures (such as carbamazepine, phenobarbital, or Tegretol®).   · Talk to your doctor if you are also using birth control pills (such as ethinyl estradiol), cyclosporine (Gengraf®, Neoral®, Sandimmune®), diazepam (Valium®), phenytoin (Dilantin®), propranolol (Inderal®), or triazolam (Halcion®). · Do not drink alcohol while you are using this medicine. Warnings While Using This Medicine:   · It is important to tell your doctor if you become pregnant. Your doctor may want you to join a pregnancy registry for patients taking this medicine. · Make sure your doctor knows if you have kidney disease, liver disease, heart disease, heart rhythm problems, high blood pressure, or have recently had chest pain or a heart attack. Tell your doctor if you have a history of mental illness or drug abuse. · Serious skin reactions can occur with this medicine. Stop using this medicine and check with your doctor right away if you have blistering, peeling, or loosening of the skin; red skin lesions; severe acne or skin rash; sores or ulcers on the skin; or fever or chills while you are using this medicine. · This medicine may cause a serious type of allergic reaction called anaphylaxis. Anaphylaxis can be life-threatening and requires immediate medical attention. Stop taking this medicine and call your doctor right away if you have a skin rash, itching, hives, hoarseness, trouble breathing, trouble swallowing, or any swelling of your hands, face, or mouth while you are using this medicine. · This medicine may cause serious allergic reactions affecting multiple body organs (e.g., heart, liver, or blood cells). Stop using this medicine and check with your doctor right away if you have the following symptoms: chest pain or discomfort, fever and chills, dark urine, headache, rash, stomach pain, unusual tiredness, unusual bleeding or bruising, or yellow eyes or skin. · Birth control pills, implants, shots, patches, vaginal rings, or an IUD may not work well while you are using this medicine. To keep from getting pregnant, use another form of birth control while you are using this medicine and for one month after your last dose.  Other forms of birth control include condoms, diaphragms, or contraceptive foams or jellies. · This medicine may make you dizzy, drowsy, or you may have trouble thinking or seeing clearly. Avoid driving, using machines, or doing anything else that could be dangerous if you are not alert. · This medicine is not for use with occasional sleepiness that has not been diagnosed as caused by narcolepsy, sleep apnea, or shift-work sleep disturbance. · This medicine can be habit-forming. Do not use more than your prescribed dose. Call your doctor if you think your medicine is not working. · Your doctor will check your progress and the effects of this medicine at regular visits. Keep all appointments. Your blood pressure may need to be checked more often while taking this medicine. Possible Side Effects While Using This Medicine:   Call your doctor right away if you notice any of these side effects:  · Allergic reaction: Itching or hives, swelling in your face or hands, swelling or tingling in your mouth or throat, chest tightness, trouble breathing  · Blistering, peeling, or red skin rash. · Chest pain. · Fast, slow, pounding, or uneven heartbeat. · Feeling unusually agitated, aggressive, confused, or excited. · Fever, chills, cough, sore throat, and body aches. · Mood or mental changes. · Numbness, tingling, or burning pain in your hands, arms, legs, or feet. · Seeing, hearing, or feeling things that are not there. · Severe muscle weakness. · Severe nausea, vomiting, or diarrhea. · Thoughts of hurting yourself and others. · Tremors or shaking. · Trouble with breathing. · Unusual bleeding, bruising, or weakness. · Unusual thoughts or behavior. If you notice these less serious side effects, talk with your doctor:   · Anxiety, nervousness, or trouble sleeping. · Back pain. · Dry mouth. · Headache or dizziness. · Mild nausea, diarrhea, upset stomach, or loss of appetite. · Mild skin rash or itching. · Painful menstrual periods. · Runny or stuffy nose.   If you notice other side effects that you think are caused by this medicine, tell your doctor. Call your doctor for medical advice about side effects. You may report side effects to FDA at 2-486-VND-5835  © 2017 [address] [employer] Information is for End User's use only and may not be sold, redistributed or otherwise used for commercial purposes. The above information is an  only. It is not intended as medical advice for individual conditions or treatments. Talk to your doctor, nurse or pharmacist before following any medical regimen to see if it is safe and effective for you.

## 2017-12-01 NOTE — PROGRESS NOTES
Chief Complaint   Patient presents with    Results     sleep study     Patient in office today to discuss sleep study results. To confirm know labs are needed before starting medication regimen for excessive daytime sleepiness. 1. Have you been to the ER, urgent care clinic since your last visit? Hospitalized since your last visit? No    2. Have you seen or consulted any other health care providers outside of the [address] [place] [address] since your last visit? Include any pap smears or colon screening.  No

## 2017-12-05 ENCOUNTER — TELEPHONE (OUTPATIENT)
Dept: FAMILY MEDICINE CLINIC | Age: 37
End: 2017-12-05

## 2017-12-05 NOTE — TELEPHONE ENCOUNTER
----- Message from Kae Zepeda sent at 12/4/2017  5:31 PM EST -----  Regarding: SAMUEL López/Telephone  Pt inquiring on the authorization of medication prescribed at last appt \"Provigil\" generic brand. This will be the second request. Best contact number [phone].

## 2017-12-07 NOTE — TELEPHONE ENCOUNTER
Called to check status of PA for modafinil. Per insurance PA was denied because a fax requesting additional information was never return. Advised that fax was not received. Asked that fax be resent.

## 2017-12-07 NOTE — TELEPHONE ENCOUNTER
Pt would like a call back to find out if a peer to peer is going to be done or if she get a different medication. CB# [phone].

## 2017-12-12 NOTE — TELEPHONE ENCOUNTER
Can we try Vyvanse? Please resubmit PA for 30 mg vyvanse. Notify pt that first medication was denied so we are resubmitting with new prescription. Will notify when we receive final word.

## 2017-12-14 NOTE — TELEPHONE ENCOUNTER
Vyvanse submitted to [employer] via cover my meds. Awaiting response. Patient id x [phone], notified and verbalized understanding.

## 2017-12-18 ENCOUNTER — TELEPHONE (OUTPATIENT)
Dept: FAMILY MEDICINE CLINIC | Age: 37
End: 2017-12-18

## 2017-12-18 NOTE — TELEPHONE ENCOUNTER
Pt calling states that she called [facility] about the p/a form that our nurse had sent to them on 12/14 and they are stating that they did not receive it. Could you please send it again.

## 2017-12-21 ENCOUNTER — TELEPHONE (OUTPATIENT)
Dept: FAMILY MEDICINE CLINIC | Age: 37
End: 2017-12-21

## 2017-12-21 DIAGNOSIS — G47.10 HYPERSOMNIA: Primary | ICD-10-CM

## 2017-12-21 NOTE — TELEPHONE ENCOUNTER
Pt calling reporting she faxed over a denial letter for Vyvanse on 12/20/17. Pt is requesting a RC from [facility] to discuss PA. Her best number is [phone].

## 2017-12-21 NOTE — TELEPHONE ENCOUNTER
At this point pt may need to follow up with sleep specialist for further evaluation to discuss treatment options because I have not been able to get any of these medications approved by insurance. Please advise.

## 2017-12-21 NOTE — TELEPHONE ENCOUNTER
vyvanse denied. Per insurance the med is not FDA approved for use for hypersomnia tx. Med will need to be changed or submitted with published literature stating that med is effective for the dx.

## 2018-01-25 ENCOUNTER — OFFICE VISIT (OUTPATIENT)
Dept: FAMILY MEDICINE CLINIC | Age: 38
End: 2018-01-25

## 2018-01-25 VITALS
SYSTOLIC BLOOD PRESSURE: 141 MMHG | HEIGHT: 62 IN | DIASTOLIC BLOOD PRESSURE: 94 MMHG | WEIGHT: 173 LBS | HEART RATE: 120 BPM | TEMPERATURE: 99.5 F | BODY MASS INDEX: 31.83 KG/M2 | RESPIRATION RATE: 18 BRPM

## 2018-01-25 DIAGNOSIS — R52 BODY ACHES: ICD-10-CM

## 2018-01-25 DIAGNOSIS — R51.9 NONINTRACTABLE HEADACHE, UNSPECIFIED CHRONICITY PATTERN, UNSPECIFIED HEADACHE TYPE: ICD-10-CM

## 2018-01-25 DIAGNOSIS — G47.10 HYPERSOMNIA: ICD-10-CM

## 2018-01-25 DIAGNOSIS — R68.89 FLU-LIKE SYMPTOMS: Primary | ICD-10-CM

## 2018-01-25 DIAGNOSIS — R05.9 COUGH: ICD-10-CM

## 2018-01-25 LAB
FLUAV+FLUBV AG NOSE QL IA.RAPID: NEGATIVE POS/NEG
FLUAV+FLUBV AG NOSE QL IA.RAPID: NEGATIVE POS/NEG
VALID INTERNAL CONTROL?: YES

## 2018-01-25 RX ORDER — OSELTAMIVIR PHOSPHATE 75 MG/1
75 CAPSULE ORAL 2 TIMES DAILY
Qty: 10 CAP | Refills: 0 | Status: SHIPPED | OUTPATIENT
Start: 2018-01-25 | End: 2018-01-30

## 2018-01-25 RX ORDER — CODEINE PHOSPHATE AND GUAIFENESIN 10; 100 MG/5ML; MG/5ML
5 SOLUTION ORAL
Qty: 180 ML | Refills: 0 | Status: SHIPPED | OUTPATIENT
Start: 2018-01-25 | End: 2018-03-23

## 2018-01-25 RX ORDER — MODAFINIL 100 MG/1
100 TABLET ORAL
Qty: 30 TAB | Refills: 0 | Status: SHIPPED | OUTPATIENT
Start: 2018-01-25 | End: 2018-03-23 | Stop reason: SDUPTHER

## 2018-01-25 NOTE — LETTER
NOTIFICATION RETURN TO WOR / SCHOOL 
 
1/25/2018 8:15 AM 
 
Ms. [address] [place] [id number] To Whom It May Concern: 
 
Narciso Aguirre is currently under the care of [address]. She will return to work on: Monday January 29th, 2018. If there are questions or concerns please have the patient contact our office.  
 
 
 
Sincerely, 
 
 
Crissy Redman NP

## 2018-01-25 NOTE — MR AVS SNAPSHOT
[address] [place] [address] 
[phone] Patient: Juli Cisneros MRN: [id number] :1980 Visit Information Date & Time Provider Department Dept. Phone Encounter #  
 2018  7:45 AM SAMUEL Bower [facility] [phone] [id number] Follow-up Instructions Return if symptoms worsen or fail to improve. Upcoming Health Maintenance Date Due Influenza Age 5 to Adult 2017 PAP AKA CERVICAL CYTOLOGY 2018 DTaP/Tdap/Td series (2 - Td) 2024 Allergies as of 2018  Review Complete On: 2018 By: Francisco Bullard NP Severity Noted Reaction Type Reactions Sulfa (Sulfonamide Antibiotics) High 10/23/2014   Systemic Hives, Itching Current Immunizations  Reviewed on 2017 Name Date  
 TB Skin Test (PPD) Intradermal 2017 Tdap 2014 Not reviewed this visit You Were Diagnosed With   
  
 Codes Comments Flu-like symptoms    -  Primary ICD-10-CM: R68.89 ICD-9-CM: 780.99 Nonintractable headache, unspecified chronicity pattern, unspecified headache type     ICD-10-CM: R51 ICD-9-CM: 784.0 Cough     ICD-10-CM: R05 ICD-9-CM: 786.2 Body aches     ICD-10-CM: R52 ICD-9-CM: 780.96 Hypersomnia     ICD-10-CM: G47.10 ICD-9-CM: 780.54 Vitals BP Pulse Temp Resp Height(growth percentile) Weight(growth percentile) (!) 141/94 (BP 1 Location: Right arm, BP Patient Position: Sitting) (!) 120 99.5 °F (37.5 °C) (Oral) 18 5' 2\" (1.575 m) 173 lb (78.5 kg) LMP BMI OB Status Smoking Status 10/28/2014 31.64 kg/m2 Hysterectomy Never Smoker Vitals History BMI and BSA Data Body Mass Index Body Surface Area  
 31.64 kg/m 2 1.85 m 2 Preferred Pharmacy Pharmacy Name Phone [facility] [address] [facility], [address] [phone] Your Updated Medication List  
  
   
 This list is accurate as of: 1/25/18  8:15 AM.  Always use your most recent med list.  
  
  
  
  
 ALPRAZolam 0.5 mg tablet Commonly known as:  Ana Casa Take 1 Tab by mouth daily as needed for Anxiety.  
  
 ergocalciferol 50,000 unit capsule Commonly known as:  ERGOCALCIFEROL Take 1 Cap by mouth every seven (7) days. guaiFENesin-codeine 100-10 mg/5 mL solution Commonly known as:  guaiFENesin AC Take 5 mL by mouth three (3) times daily as needed for Cough. Max Daily Amount: 15 mL. modafinil 100 mg tablet Commonly known as:  PROVIGIL Take 1 Tab by mouth every morning. Max Daily Amount: 100 mg.  
  
 oseltamivir 75 mg capsule Commonly known as:  TAMIFLU Take 1 Cap by mouth two (2) times a day for 5 days. sertraline 50 mg tablet Commonly known as:  ZOLOFT Take 1 Tab by mouth daily. Prescriptions Printed Refills  
 modafinil (PROVIGIL) 100 mg tablet 0 Sig: Take 1 Tab by mouth every morning. Max Daily Amount: 100 mg. Class: Print Route: Oral  
 guaiFENesin-codeine (GUAIFENESIN AC) 100-10 mg/5 mL solution 0 Sig: Take 5 mL by mouth three (3) times daily as needed for Cough. Max Daily Amount: 15 mL. Class: Print Route: Oral  
  
Prescriptions Sent to Pharmacy Refills  
 oseltamivir (TAMIFLU) 75 mg capsule 0 Sig: Take 1 Cap by mouth two (2) times a day for 5 days. Class: Normal  
 Pharmacy: [address] #: [phone] Route: Oral  
  
We Performed the Following AMB POC MARY INFLUENZA A/B TEST [77767 CPT(R)] Follow-up Instructions Return if symptoms worsen or fail to improve. Patient Instructions Influenza (Flu): Care Instructions Your Care Instructions Influenza (flu) is an infection in the lungs and breathing passages. It is caused by the influenza virus.  There are different strains, or types, of the flu virus from year to year. Unlike the common cold, the flu comes on suddenly and the symptoms, such as a cough, congestion, fever, chills, fatigue, aches, and pains, are more severe. These symptoms may last up to 10 days. Although the flu can make you feel very sick, it usually doesn't cause serious health problems. Home treatment is usually all you need for flu symptoms. But your doctor may prescribe antiviral medicine to prevent other health problems, such as pneumonia, from developing. Older people and those who have a long-term health condition, such as lung disease, are most at risk for having pneumonia or other health problems. Follow-up care is a key part of your treatment and safety. Be sure to make and go to all appointments, and call your doctor if you are having problems. It's also a good idea to know your test results and keep a list of the medicines you take. How can you care for yourself at home? · Get plenty of rest. 
· Drink plenty of fluids, enough so that your urine is light yellow or clear like water. If you have kidney, heart, or liver disease and have to limit fluids, talk with your doctor before you increase the amount of fluids you drink. · Take an over-the-counter pain medicine if needed, such as acetaminophen (Tylenol), ibuprofen (Advil, Motrin), or naproxen (Aleve), to relieve fever, headache, and muscle aches. Read and follow all instructions on the label. No one younger than 20 should take aspirin. It has been linked to Reye syndrome, a serious illness. · Do not smoke. Smoking can make the flu worse. If you need help quitting, talk to your doctor about stop-smoking programs and medicines. These can increase your chances of quitting for good. · Breathe moist air from a hot shower or from a sink filled with hot water to help clear a stuffy nose. · Before you use cough and cold medicines, check the label.  These medicines may not be safe for young children or for people with certain health problems. · If the skin around your nose and lips becomes sore, put some petroleum jelly on the area. · To ease coughing: ¨ Drink fluids to soothe a scratchy throat. ¨ Suck on cough drops or plain hard candy. ¨ Take an over-the-counter cough medicine that contains dextromethorphan to help you get some sleep. Read and follow all instructions on the label. ¨ Raise your head at night with an extra pillow. This may help you rest if coughing keeps you awake. · Take any prescribed medicine exactly as directed. Call your doctor if you think you are having a problem with your medicine. To avoid spreading the flu · Wash your hands regularly, and keep your hands away from your face. · Stay home from school, work, and other public places until you are feeling better and your fever has been gone for at least 24 hours. The fever needs to have gone away on its own without the help of medicine. · Ask people living with you to talk to their doctors about preventing the flu. They may get antiviral medicine to keep from getting the flu from you. · To prevent the flu in the future, get a flu vaccine every fall. Encourage people living with you to get the vaccine. · Cover your mouth when you cough or sneeze. When should you call for help? Call 911 anytime you think you may need emergency care. For example, call if: 
? · You have severe trouble breathing. ?Call your doctor now or seek immediate medical care if: 
? · You have new or worse trouble breathing. ? · You seem to be getting much sicker. ? · You feel very sleepy or confused. ? · You have a new or higher fever. ? · You get a new rash. ? Watch closely for changes in your health, and be sure to contact your doctor if: 
? · You begin to get better and then get worse. ? · You are not getting better after 1 week. Where can you learn more? Go to http://aidan-faiza.info/. Enter J205 in the search box to learn more about \"Influenza (Flu): Care Instructions. \" Current as of: May 12, 2017 Content Version: 11.4 © 9618-7582 [employer]. Care instructions adapted under license by [employer] (which disclaims liability or warranty for this information). If you have questions about a medical condition or this instruction, always ask your healthcare professional. [address] any warranty or liability for your use of this information. Introducing [facility]! Dear Phoenix Childers: 
Thank you for requesting a 2d2c account. Our records indicate that you already have an active 2d2c account. You can access your account anytime at [email or web address] Did you know that you can access your hospital and ER discharge instructions at any time in 2d2c? You can also review all of your test results from your hospital stay or ER visit. Additional Information If you have questions, please visit the Frequently Asked Questions section of the 2d2c website at [email or web address] Remember, 2d2c is NOT to be used for urgent needs. For medical emergencies, dial 911. Now available from your iPhone and Android! Please provide this summary of care documentation to your next provider. Your primary care clinician is listed as Mallory Alejandre. If you have any questions after today's visit, please call [phone].

## 2018-01-25 NOTE — PROGRESS NOTES
Chief Complaint   Patient presents with    Insomnia    Medication Evaluation    Cough    Fever     Patient in office today for f/u on insomnia and med check; pt states medication has not improved sx. Have c/o of cold sx that began 2 days ago; have been treating with Yesi-seltzer Plus and Guaifenesin-AC. 1. Have you been to the ER, urgent care clinic since your last visit? Hospitalized since your last visit? No    2. Have you seen or consulted any other health care providers outside of the [address] [place] [address] since your last visit? Include any pap smears or colon screening.  No

## 2018-01-25 NOTE — PROGRESS NOTES
Chief Complaint   Patient presents with    Insomnia    Medication Evaluation    Cough    Fever     Patient in office today for f/u on hypersomnia and med check; pt states medication has not improved sx. Started with provigil which helped but insurance would not cover. Could tell a big difference even with only taking for a few days. Therefore rx was switched to 30 mg Vyvanse but noticed no improvement of sx. Pt has new health insurance. Have c/o of cold sx that began 2 days ago; have been treating with Yesi-seltzer Plus and Guaifenesin-AC. Sx started about 2 days ago. Sick contacts include daughter. Received her flu shot. Sx include cough that started Tuesday. Yesterday noticed chills, couldn't get warm. Fever up to 100.8 yesterday. Took OTCs last night which helped break the fever. This morning has a severe HA and persistent cough. Diffuse body aches. Denies any other concerns at this time. Chief Complaint   Patient presents with    Insomnia    Medication Evaluation    Cough    Fever     she is a 42y.o. year old female who presents for evalution. Reviewed PmHx, RxHx, FmHx, SocHx, AllgHx and updated and dated in the chart.     Review of Systems - negative except as listed above in the HPI    Objective:     Vitals:    01/25/18 0746   BP: (!) 141/94   Pulse: (!) 120   Resp: 18   Temp: 99.5 °F (37.5 °C)   TempSrc: Oral   Weight: 173 lb (78.5 kg)   Height: 5' 2\" (1.575 m)     Physical Examination: General appearance - alert, ill appearing but in no acute distress  Mental status - normal mood, behavior  Eyes - pupils equal and reactive, extraocular eye movements intact  Ears - bilateral TM's and external ear canals normal  Nose - mucosal congestion, mucosal erythema, clear rhinorrhea and sinuses normal and nontender  Mouth - mucous membranes moist, pharynx normal without lesions  Neck - supple, no significant adenopathy  Chest - clear to auscultation, no wheezes, rales or rhonchi, symmetric air entry  Heart - normal rate, regular rhythm, normal S1, S2, no murmurs    Assessment/ Plan:   Diagnoses and all orders for this visit:    1. Flu-like symptoms  -     oseltamivir (TAMIFLU) 75 mg capsule; Take 1 Cap by mouth two (2) times a day for 5 days. Sx and presentation are consistent with flu virus. Start and complete full course of tamiflu. Dwp ADRs/SEs of medication. Push fluids. Rest. Saline nasal spray for nasal congestion. OTC motrin/apap for fevers. RTC if sx persist or worsen. 2. Nonintractable headache, unspecified chronicity pattern, unspecified headache type / 3. Cough / 4. Body aches  -     AMB POC MARY INFLUENZA A/B TEST  -     guaiFENesin-codeine (GUAIFENESIN AC) 100-10 mg/5 mL solution; Take 5 mL by mouth three (3) times daily as needed for Cough. Max Daily Amount: 15 mL. Negative for flu, discussed that she may not be shedding virus yet. PRN robitussin AC for cough. 5. Hypersomnia  -     modafinil (PROVIGIL) 100 mg tablet; Take 1 Tab by mouth every morning. Max Daily Amount: 100 mg. Will try to run rx under new insurance provider. Will resubmit PA if not approved. Follow-up Disposition:  Return if symptoms worsen or fail to improve. I have discussed the diagnosis with the patient and the intended plan as seen in the above orders. The patient has received an after-visit summary and questions were answered concerning future plans. Medication Side Effects and Warnings were discussed with patient: yes  Patient Labs were reviewed and or requested: yes  Patient Past Records were reviewed and or requested  yes  Patient / Caregiver Understanding of treatment plan was verbalized during office visit FRANK Garner    Patient Instructions          Influenza (Flu): Care Instructions  Your Care Instructions    Influenza (flu) is an infection in the lungs and breathing passages. It is caused by the influenza virus.  There are different strains, or types, of the flu virus from year to year. Unlike the common cold, the flu comes on suddenly and the symptoms, such as a cough, congestion, fever, chills, fatigue, aches, and pains, are more severe. These symptoms may last up to 10 days. Although the flu can make you feel very sick, it usually doesn't cause serious health problems. Home treatment is usually all you need for flu symptoms. But your doctor may prescribe antiviral medicine to prevent other health problems, such as pneumonia, from developing. Older people and those who have a long-term health condition, such as lung disease, are most at risk for having pneumonia or other health problems. Follow-up care is a key part of your treatment and safety. Be sure to make and go to all appointments, and call your doctor if you are having problems. It's also a good idea to know your test results and keep a list of the medicines you take. How can you care for yourself at home? · Get plenty of rest.  · Drink plenty of fluids, enough so that your urine is light yellow or clear like water. If you have kidney, heart, or liver disease and have to limit fluids, talk with your doctor before you increase the amount of fluids you drink. · Take an over-the-counter pain medicine if needed, such as acetaminophen (Tylenol), ibuprofen (Advil, Motrin), or naproxen (Aleve), to relieve fever, headache, and muscle aches. Read and follow all instructions on the label. No one younger than 20 should take aspirin. It has been linked to Reye syndrome, a serious illness. · Do not smoke. Smoking can make the flu worse. If you need help quitting, talk to your doctor about stop-smoking programs and medicines. These can increase your chances of quitting for good. · Breathe moist air from a hot shower or from a sink filled with hot water to help clear a stuffy nose. · Before you use cough and cold medicines, check the label.  These medicines may not be safe for young children or for people with certain health problems. · If the skin around your nose and lips becomes sore, put some petroleum jelly on the area. · To ease coughing:  ¨ Drink fluids to soothe a scratchy throat. ¨ Suck on cough drops or plain hard candy. ¨ Take an over-the-counter cough medicine that contains dextromethorphan to help you get some sleep. Read and follow all instructions on the label. ¨ Raise your head at night with an extra pillow. This may help you rest if coughing keeps you awake. · Take any prescribed medicine exactly as directed. Call your doctor if you think you are having a problem with your medicine. To avoid spreading the flu  · Wash your hands regularly, and keep your hands away from your face. · Stay home from school, work, and other public places until you are feeling better and your fever has been gone for at least 24 hours. The fever needs to have gone away on its own without the help of medicine. · Ask people living with you to talk to their doctors about preventing the flu. They may get antiviral medicine to keep from getting the flu from you. · To prevent the flu in the future, get a flu vaccine every fall. Encourage people living with you to get the vaccine. · Cover your mouth when you cough or sneeze. When should you call for help? Call 911 anytime you think you may need emergency care. For example, call if:  ? · You have severe trouble breathing. ?Call your doctor now or seek immediate medical care if:  ? · You have new or worse trouble breathing. ? · You seem to be getting much sicker. ? · You feel very sleepy or confused. ? · You have a new or higher fever. ? · You get a new rash. ? Watch closely for changes in your health, and be sure to contact your doctor if:  ? · You begin to get better and then get worse. ? · You are not getting better after 1 week. Where can you learn more? Go to http://aidan-faiza.info/.   Enter F282 in the search box to learn more about \"Influenza (Flu): Care Instructions. \"  Current as of: May 12, 2017  Content Version: 11.4  © 8026-4887 Healthwise, Incorporated. Care instructions adapted under license by [employer] (which disclaims liability or warranty for this information). If you have questions about a medical condition or this instruction, always ask your healthcare professional. [address] any warranty or liability for your use of this information.

## 2018-01-25 NOTE — PATIENT INSTRUCTIONS

## 2018-01-29 ENCOUNTER — TELEPHONE (OUTPATIENT)
Dept: FAMILY MEDICINE CLINIC | Age: 38
End: 2018-01-29

## 2018-01-29 NOTE — TELEPHONE ENCOUNTER
Pt called requesting a work excuse note to have her return to work on [employer]. Jan. 31. Please advise pt when ready, [phone]. Pt states that she is still not feeling well.

## 2018-01-29 NOTE — LETTER
NOTIFICATION RETURN TO WORK 
 
1/29/2018 11:05 AM 
 
Ms. [address] [facility] [address] To Whom It May Concern: 
 
Narciso Aguirre is currently under the care of [address]. She will return to work on: Wednesday January 31st, 2018. Please excuse her from the days she has missed due to illness. If there are questions or concerns please have the patient contact our office.  
 
 
 
Sincerely, 
 
 
Miko Beard NP

## 2018-02-08 ENCOUNTER — DOCUMENTATION ONLY (OUTPATIENT)
Dept: FAMILY MEDICINE CLINIC | Age: 38
End: 2018-02-08

## 2018-02-08 NOTE — PROGRESS NOTES
Pa for Modafinil approved PA# [id number] from 1/29/18 thru 1/29/19 , copy faxed to pharmacy and placed in scan folder to be scanned to chart

## 2018-02-11 NOTE — TELEPHONE ENCOUNTER
Results of PSG/MSLT reviewed with patient who indicated that she is doing well with modafinil 100 mg prescribed by her PCP. She reported of sleeping 61/2 hours on an average per night. She was advised to increase sleep duration to 7 to 8 hours, to continue to follow-up with her PCP for her Provigil prescription and to call if there were any further questions regarding sleep symptoms.

## 2018-03-23 ENCOUNTER — OFFICE VISIT (OUTPATIENT)
Dept: FAMILY MEDICINE CLINIC | Age: 38
End: 2018-03-23

## 2018-03-23 VITALS
HEART RATE: 102 BPM | RESPIRATION RATE: 18 BRPM | WEIGHT: 176 LBS | DIASTOLIC BLOOD PRESSURE: 94 MMHG | SYSTOLIC BLOOD PRESSURE: 137 MMHG | BODY MASS INDEX: 32.39 KG/M2 | HEIGHT: 62 IN | TEMPERATURE: 98.7 F

## 2018-03-23 DIAGNOSIS — B35.9 TINEA: ICD-10-CM

## 2018-03-23 DIAGNOSIS — G47.10 HYPERSOMNIA: ICD-10-CM

## 2018-03-23 DIAGNOSIS — L74.510 HYPERHIDROSIS OF AXILLA: Primary | ICD-10-CM

## 2018-03-23 RX ORDER — CLOTRIMAZOLE AND BETAMETHASONE DIPROPIONATE 10; .64 MG/G; MG/G
CREAM TOPICAL
Qty: 15 G | Refills: 1 | Status: SHIPPED | OUTPATIENT
Start: 2018-03-23 | End: 2018-10-25

## 2018-03-23 RX ORDER — MODAFINIL 200 MG/1
200 TABLET ORAL
Qty: 30 TAB | Refills: 2 | Status: SHIPPED | OUTPATIENT
Start: 2018-03-23 | End: 2018-10-25

## 2018-03-23 NOTE — MR AVS SNAPSHOT
[address] [address] 
[phone] Patient: Brendan Nagel MRN: [id number] :1980 Visit Information Date & Time Provider Department Dept. Phone Encounter #  
 3/23/2018  7:45 AM Ena Doyle NP [address] [facility] [phone] [id number] Follow-up Instructions Return in about 3 months (around 2018) for follow up. Upcoming Health Maintenance Date Due Influenza Age 5 to Adult 2017 PAP AKA CERVICAL CYTOLOGY 2018 DTaP/Tdap/Td series (2 - Td) 2024 Allergies as of 3/23/2018  Review Complete On: 3/23/2018 By: Ena Doyle NP Severity Noted Reaction Type Reactions Sulfa (Sulfonamide Antibiotics) High 10/23/2014   Systemic Hives, Itching Current Immunizations  Reviewed on 2017 Name Date  
 TB Skin Test (PPD) Intradermal 2017 Tdap 2014 Not reviewed this visit You Were Diagnosed With   
  
 Codes Comments Hyperhidrosis of axilla    -  Primary ICD-10-CM: L74.510 ICD-9-CM: 705.21 Tinea     ICD-10-CM: B35.9 ICD-9-CM: 110.9 Hypersomnia     ICD-10-CM: G47.10 ICD-9-CM: 780.54 Vitals BP Pulse Temp Resp Height(growth percentile) Weight(growth percentile) (!) 137/94 (BP 1 Location: Right arm, BP Patient Position: Sitting) (!) 102 98.7 °F (37.1 °C) (Oral) 18 5' 2\" (1.575 m) 176 lb (79.8 kg) LMP BMI OB Status Smoking Status 10/28/2014 32.19 kg/m2 Hysterectomy Never Smoker Vitals History BMI and BSA Data Body Mass Index Body Surface Area  
 32.19 kg/m 2 1.87 m 2 Preferred Pharmacy Pharmacy Name Phone Khloe Mckeon [address] , [address] [facility], [address] [phone] Your Updated Medication List  
  
   
This list is accurate as of 3/23/18  8:14 AM.  Always use your most recent med list.  
  
  
  
  
 ALPRAZolam 0.5 mg tablet Commonly known as:  Minong Hams Take 1 Tab by mouth daily as needed for Anxiety. aluminum chloride 20 % external solution Commonly known as:  DRYSOL Apply to bilateral axilla nightly. clotrimazole-betamethasone topical cream  
Commonly known as:  Terri Keaton Use thin layer BID for no more than 2 weeks  
  
 ergocalciferol 50,000 unit capsule Commonly known as:  ERGOCALCIFEROL Take 1 Cap by mouth every seven (7) days. modafinil 200 mg tablet Commonly known as:  PROVIGIL Take 1 Tab by mouth every morning. Max Daily Amount: 200 mg.  
  
 sertraline 50 mg tablet Commonly known as:  ZOLOFT  
TAKE ONE TABLET BY MOUTH DAILY Prescriptions Printed Refills  
 modafinil (PROVIGIL) 200 mg tablet 2 Sig: Take 1 Tab by mouth every morning. Max Daily Amount: 200 mg. Class: Print Route: Oral  
  
Prescriptions Sent to Pharmacy Refills  
 clotrimazole-betamethasone (LOTRISONE) topical cream 1 Sig: Use thin layer BID for no more than 2 weeks Class: Normal  
 Pharmacy: [place] [facility] Ph #: [phone]  
 aluminum chloride (DRYSOL) 20 % external solution 1 Sig: Apply to bilateral axilla nightly. Class: Normal  
 Pharmacy: [address] Ph #: [phone] Follow-up Instructions Return in about 3 months (around 6/23/2018) for follow up. Introducing [facility] SERVICES! Dear Valentina Kaplan: 
Thank you for requesting a [employer] account. Our records indicate that you already have an active [employer] account. You can access your account anytime at [email or web address] Did you know that you can access your hospital and ER discharge instructions at any time in [employer]? You can also review all of your test results from your hospital stay or ER visit. Additional Information If you have questions, please visit the Frequently Asked Questions section of the [employer] website at [email or web address] Remember, [employer] is NOT to be used for urgent needs. For medical emergencies, dial 911. Now available from your iPhone and Android! Please provide this summary of care documentation to your next provider. Your primary care clinician is listed as Mallory Alejandre. If you have any questions after today's visit, please call [phone].

## 2018-03-23 NOTE — PROGRESS NOTES
Chief Complaint   Patient presents with    Sleep Problem    Medication Evaluation    Skin Problem     Patient in office today for f/u on hypersomnia. Pt states medication is doing well, would like to discuss increasing dosage. Have c/o of skin problem located in bilateral armpits, pt states skin area is noted with itchy dark spots. 1. Have you been to the ER, urgent care clinic since your last visit? Hospitalized since your last visit? No    2. Have you seen or consulted any other health care providers outside of the [address] [place] [address] since your last visit? Include any pap smears or colon screening.  No

## 2018-03-23 NOTE — PROGRESS NOTES
Chief Complaint   Patient presents with    Sleep Problem    Medication Evaluation    Skin Problem     Patient in office today for f/u on hypersomnia. Pt states medication is doing well, would like to discuss increasing dosage. Notices when she does not take the medication that she cannot focus or concentrate. Just seemed to build a tolerance to the dose. Getting through her day at work but still exhausted when she gets home from work. Have c/o of skin problem located in bilateral armpits, pt states skin area is noted with itchy dark spots. Has previously been prescribed a cream for this which helped. Reports excessive sweating in these areas which seems to make things worse. Denies any new products or razors. Denies any other concerns at this time. Chief Complaint   Patient presents with    Sleep Problem    Medication Evaluation    Skin Problem     she is a 42y.o. year old female who presents for evalution. Reviewed PmHx, RxHx, FmHx, SocHx, AllgHx and updated and dated in the chart. Review of Systems - negative except as listed above in the HPI    Objective:     Vitals:    03/23/18 0745   BP: (!) 137/94   Pulse: (!) 102   Resp: 18   Temp: 98.7 °F (37.1 °C)   TempSrc: Oral   Weight: 176 lb (79.8 kg)   Height: 5' 2\" (1.575 m)     Physical Examination: General appearance - alert, well appearing, and in no distress  Mental status - normal mood, behavior  Chest - clear to auscultation, no wheezes, rales or rhonchi, symmetric air entry  Heart - normal rate, regular rhythm, normal S1, S2, no murmurs  Skin - hyperpigmented papules present localized to bilateral axilla, pt reports very itchy and typically gets more erythematous with time    Assessment/ Plan:   Diagnoses and all orders for this visit:    1. Hyperhidrosis of axilla  -     aluminum chloride (DRYSOL) 20 % external solution; Apply to bilateral axilla nightly. Start drysol to help address hyperhidrosis.    2. Tinea  - clotrimazole-betamethasone (LOTRISONE) topical cream; Use thin layer BID for no more than 2 weeks  Apply as directed. Reviewed SEs/DRs of medication. Follow up if sx persist or worsen. 3. Hypersomnia  -     modafinil (PROVIGIL) 200 mg tablet; Take 1 Tab by mouth every morning. Max Daily Amount: 200 mg. Increase to 200 mg daily. Follow up in 3 months for med eval, sooner if needed. Follow-up Disposition:  Return in about 3 months (around 6/23/2018) for follow up. I have discussed the diagnosis with the patient and the intended plan as seen in the above orders. The patient has received an after-visit summary and questions were answered concerning future plans. Medication Side Effects and Warnings were discussed with patient: yes  Patient Labs were reviewed and or requested: yes  Patient Past Records were reviewed and or requested  yes  Patient / Caregiver Understanding of treatment plan was verbalized during office visit YES    FRANK Sheppard    There are no Patient Instructions on file for this visit.

## 2018-04-05 ENCOUNTER — OFFICE VISIT (OUTPATIENT)
Dept: FAMILY MEDICINE CLINIC | Age: 38
End: 2018-04-05

## 2018-04-05 VITALS
DIASTOLIC BLOOD PRESSURE: 91 MMHG | RESPIRATION RATE: 18 BRPM | TEMPERATURE: 97.6 F | HEART RATE: 100 BPM | HEIGHT: 62 IN | SYSTOLIC BLOOD PRESSURE: 135 MMHG | BODY MASS INDEX: 32.39 KG/M2 | WEIGHT: 176 LBS | OXYGEN SATURATION: 100 %

## 2018-04-05 DIAGNOSIS — J06.9 ACUTE URI: Primary | ICD-10-CM

## 2018-04-05 RX ORDER — FLUTICASONE PROPIONATE 50 MCG
SPRAY, SUSPENSION (ML) NASAL
Qty: 1 BOTTLE | Refills: 2 | Status: SHIPPED | OUTPATIENT
Start: 2018-04-05 | End: 2018-10-25

## 2018-04-05 NOTE — PROGRESS NOTES
Patient here for congestion, sore throat, cough since Monday. 1. Have you been to the ER, urgent care clinic since your last visit? Hospitalized since your last visit? No    2. Have you seen or consulted any other health care providers outside of the [facility] since your last visit? Include any pap smears or colon screening.  No

## 2018-04-05 NOTE — PATIENT INSTRUCTIONS
Upper Respiratory Infection (Cold): Care Instructions  Your Care Instructions    An upper respiratory infection, or URI, is an infection of the nose, sinuses, or throat. URIs are spread by coughs, sneezes, and direct contact. The common cold is the most frequent kind of URI. The flu and sinus infections are other kinds of URIs. Almost all URIs are caused by viruses. Antibiotics won't cure them. But you can treat most infections with home care. This may include drinking lots of fluids and taking over-the-counter pain medicine. You will probably feel better in 4 to 10 days. The doctor has checked you carefully, but problems can develop later. If you notice any problems or new symptoms, get medical treatment right away. Follow-up care is a key part of your treatment and safety. Be sure to make and go to all appointments, and call your doctor if you are having problems. It's also a good idea to know your test results and keep a list of the medicines you take. How can you care for yourself at home? · To prevent dehydration, drink plenty of fluids, enough so that your urine is light yellow or clear like water. Choose water and other caffeine-free clear liquids until you feel better. If you have kidney, heart, or liver disease and have to limit fluids, talk with your doctor before you increase the amount of fluids you drink. · Take an over-the-counter pain medicine, such as acetaminophen (Tylenol), ibuprofen (Advil, Motrin), or naproxen (Aleve). Read and follow all instructions on the label. · Before you use cough and cold medicines, check the label. These medicines may not be safe for young children or for people with certain health problems. · Be careful when taking over-the-counter cold or flu medicines and Tylenol at the same time. Many of these medicines have acetaminophen, which is Tylenol. Read the labels to make sure that you are not taking more than the recommended dose.  Too much acetaminophen (Tylenol) can be harmful. · Get plenty of rest.  · Do not smoke or allow others to smoke around you. If you need help quitting, talk to your doctor about stop-smoking programs and medicines. These can increase your chances of quitting for good. When should you call for help? Call 911 anytime you think you may need emergency care. For example, call if:  ? · You have severe trouble breathing. ?Call your doctor now or seek immediate medical care if:  ? · You seem to be getting much sicker. ? · You have new or worse trouble breathing. ? · You have a new or higher fever. ? · You have a new rash. ? Watch closely for changes in your health, and be sure to contact your doctor if:  ? · You have a new symptom, such as a sore throat, an earache, or sinus pain. ? · You cough more deeply or more often, especially if you notice more mucus or a change in the color of your mucus. ? · You do not get better as expected. Where can you learn more? Go to http://aidan-faiza.info/. Enter V334 in the search box to learn more about \"Upper Respiratory Infection (Cold): Care Instructions. \"  Current as of: May 12, 2017  Content Version: 11.4  © 8424-0658 Healthwise, Incorporated. Care instructions adapted under license by [employer] (which disclaims liability or warranty for this information). If you have questions about a medical condition or this instruction, always ask your healthcare professional. [address] [address] any warranty or liability for your use of this information.

## 2018-04-05 NOTE — MR AVS SNAPSHOT
[address] [address] 
[phone] Patient: Nupur French MRN: [id number] :1980 Visit Information Date & Time Provider Department Dept. Phone Encounter #  
 2018  7:15 AM Adeel Haque NP [address] [facility] [phone] [id number] Follow-up Instructions Return if symptoms worsen or fail to improve. Upcoming Health Maintenance Date Due Influenza Age 5 to Adult 2017 PAP AKA CERVICAL CYTOLOGY 2018 DTaP/Tdap/Td series (2 - Td) 2024 Allergies as of 2018  Review Complete On: 2018 By: Lashawn Amezcua LPN Severity Noted Reaction Type Reactions Sulfa (Sulfonamide Antibiotics) High 10/23/2014   Systemic Hives, Itching Current Immunizations  Reviewed on 2017 Name Date  
 TB Skin Test (PPD) Intradermal 2017 Tdap 2014 Not reviewed this visit You Were Diagnosed With   
  
 Codes Comments Acute URI    -  Primary ICD-10-CM: J06.9 ICD-9-CM: 465.9 Vitals BP Pulse Temp Resp Height(growth percentile) Weight(growth percentile) (!) 135/91 100 97.6 °F (36.4 °C) 18 5' 2\" (1.575 m) 176 lb (79.8 kg) LMP SpO2 BMI OB Status Smoking Status 10/28/2014 100% 32.19 kg/m2 Hysterectomy Never Smoker Vitals History BMI and BSA Data Body Mass Index Body Surface Area  
 32.19 kg/m 2 1.87 m 2 Preferred Pharmacy Pharmacy Name Phone Wes Carrillo [address] [facility], [address] [place] [address], [address] [phone] Your Updated Medication List  
  
   
This list is accurate as of 18  7:43 AM.  Always use your most recent med list.  
  
  
  
  
 ALPRAZolam 0.5 mg tablet Commonly known as:  Karen Ream Take 1 Tab by mouth daily as needed for Anxiety. aluminum chloride 20 % external solution Commonly known as:  DRYSOL Apply to bilateral axilla nightly.   
  
 clotrimazole-betamethasone topical cream  
 Commonly known as:  Jeaneth Yarbroughler Use thin layer BID for no more than 2 weeks  
  
 ergocalciferol 50,000 unit capsule Commonly known as:  ERGOCALCIFEROL Take 1 Cap by mouth every seven (7) days. fluticasone 50 mcg/actuation nasal spray Commonly known as:  Julius Fanrock Use 1 spray in each nostril BID  
  
 modafinil 200 mg tablet Commonly known as:  PROVIGIL Take 1 Tab by mouth every morning. Max Daily Amount: 200 mg.  
  
 sertraline 50 mg tablet Commonly known as:  ZOLOFT  
TAKE ONE TABLET BY MOUTH DAILY Prescriptions Sent to Pharmacy Refills  
 fluticasone (FLONASE) 50 mcg/actuation nasal spray 2 Sig: Use 1 spray in each nostril BID Class: Normal  
 Pharmacy: [address] #: [phone] Follow-up Instructions Return if symptoms worsen or fail to improve. Patient Instructions Upper Respiratory Infection (Cold): Care Instructions Your Care Instructions An upper respiratory infection, or URI, is an infection of the nose, sinuses, or throat. URIs are spread by coughs, sneezes, and direct contact. The common cold is the most frequent kind of URI. The flu and sinus infections are other kinds of URIs. Almost all URIs are caused by viruses. Antibiotics won't cure them. But you can treat most infections with home care. This may include drinking lots of fluids and taking over-the-counter pain medicine. You will probably feel better in 4 to 10 days. The doctor has checked you carefully, but problems can develop later. If you notice any problems or new symptoms, get medical treatment right away. Follow-up care is a key part of your treatment and safety. Be sure to make and go to all appointments, and call your doctor if you are having problems. It's also a good idea to know your test results and keep a list of the medicines you take. How can you care for yourself at home? · To prevent dehydration, drink plenty of fluids, enough so that your urine is light yellow or clear like water. Choose water and other caffeine-free clear liquids until you feel better. If you have kidney, heart, or liver disease and have to limit fluids, talk with your doctor before you increase the amount of fluids you drink. · Take an over-the-counter pain medicine, such as acetaminophen (Tylenol), ibuprofen (Advil, Motrin), or naproxen (Aleve). Read and follow all instructions on the label. · Before you use cough and cold medicines, check the label. These medicines may not be safe for young children or for people with certain health problems. · Be careful when taking over-the-counter cold or flu medicines and Tylenol at the same time. Many of these medicines have acetaminophen, which is Tylenol. Read the labels to make sure that you are not taking more than the recommended dose. Too much acetaminophen (Tylenol) can be harmful. · Get plenty of rest. 
· Do not smoke or allow others to smoke around you. If you need help quitting, talk to your doctor about stop-smoking programs and medicines. These can increase your chances of quitting for good. When should you call for help? Call 911 anytime you think you may need emergency care. For example, call if: 
? · You have severe trouble breathing. ?Call your doctor now or seek immediate medical care if: 
? · You seem to be getting much sicker. ? · You have new or worse trouble breathing. ? · You have a new or higher fever. ? · You have a new rash. ? Watch closely for changes in your health, and be sure to contact your doctor if: 
? · You have a new symptom, such as a sore throat, an earache, or sinus pain. ? · You cough more deeply or more often, especially if you notice more mucus or a change in the color of your mucus. ? · You do not get better as expected. Where can you learn more? Go to http://aidan-faiza.info/. Enter B372 in the search box to learn more about \"Upper Respiratory Infection (Cold): Care Instructions. \" Current as of: May 12, 2017 Content Version: 11.4 © 4930-7475 [employer]. Care instructions adapted under license by [employer] (which disclaims liability or warranty for this information). If you have questions about a medical condition or this instruction, always ask your healthcare professional. [address] any warranty or liability for your use of this information. Introducing [facility]! Dear Kip Souza: 
Thank you for requesting a [employer] account. Our records indicate that you already have an active [employer] account. You can access your account anytime at [email or web address]. Public Mobile/LÃƒÂ©a et LÃƒÂ©o Did you know that you can access your hospital and ER discharge instructions at any time in [employer]? You can also review all of your test results from your hospital stay or ER visit. Additional Information If you have questions, please visit the Frequently Asked Questions section of the [employer] website at [email or web address] Remember, [employer] is NOT to be used for urgent needs. For medical emergencies, dial 911. Now available from your iPhone and Android! Please provide this summary of care documentation to your next provider. Your primary care clinician is listed as Mallory Alejandre. If you have any questions after today's visit, please call [phone].

## 2018-04-05 NOTE — LETTER
4/5/2018 7:43 AM 
 
Ms. Narciso [address] [place] [address] [phone] Please excuse Ms. Fe Bueno from work today and possibly tomorrow due to illness. Sincerely, Mando Martines NP

## 2018-04-05 NOTE — PROGRESS NOTES
Chief Complaint   Patient presents with    Nasal Congestion     sore throat, nasal congestion , since Monday. she is a 42y.o. year old female who presents for evalution. Pt states starting Monday started having congestion and sore throat, lots of sneezing. Has been taking Zyrtec-D, still having congestion and sinus pressure. Slight dry cough. Reviewed PmHx, RxHx, FmHx, SocHx, AllgHx and updated and dated in the chart. Review of Systems - negative except as listed above in the HPI    Objective:     Vitals:    04/05/18 0732   BP: (!) 135/91   Pulse: 100   Resp: 18   Temp: 97.6 °F (36.4 °C)   SpO2: 100%   Weight: 176 lb (79.8 kg)   Height: 5' 2\" (1.575 m)     Physical Examination: General appearance - alert, well appearing, and in no distress  Ears - bilateral TM's and external ear canals normal  Nose - normal nontender sinuses, mucosal congestion and mucosal pallor  Mouth - mucous membranes moist, pharynx normal without lesions  Neck - supple, no significant adenopathy  Chest - clear to auscultation, no wheezes, rales or rhonchi, symmetric air entry  Heart - normal rate, regular rhythm, normal S1, S2, no murmurs, rubs, clicks or gallops    Assessment/ Plan:   Diagnoses and all orders for this visit:    1. Acute URI  -     fluticasone (FLONASE) 50 mcg/actuation nasal spray; Use 1 spray in each nostril BID  Discussed and encouraged rest and clear fluids, if congestion is thick should avoid dairy. Recommended hot showers with steam and elevating head of bed. May use Vitamin C or Echinacea in addition to current therapy. Pt voiced understanding regarding plan of care. Follow-up Disposition:  Return if symptoms worsen or fail to improve. I have discussed the diagnosis with the patient and the intended plan as seen in the above orders. The patient has received an after-visit summary and questions were answered concerning future plans.      Medication Side Effects and Warnings were discussed with patient    Pardeep Conn, NP

## 2018-04-13 ENCOUNTER — TELEPHONE (OUTPATIENT)
Dept: FAMILY MEDICINE CLINIC | Age: 38
End: 2018-04-13

## 2018-04-13 RX ORDER — AZITHROMYCIN 250 MG/1
TABLET, FILM COATED ORAL
Qty: 6 TAB | Refills: 0 | Status: SHIPPED | OUTPATIENT
Start: 2018-04-13 | End: 2018-04-18

## 2018-04-13 NOTE — TELEPHONE ENCOUNTER
Likely just secondary to pollen and weather changes, color of mucus does not indicate infection. However, sent in z-pack to pharmacy, pt should only take if develops fevers over 101.    Thanks,  N

## 2018-04-13 NOTE — TELEPHONE ENCOUNTER
----- Message from Zamzam Porras sent at 4/13/2018  7:50 AM EDT -----  Regarding: NP, Bell/telephone  Pt is requesting a call back regarding her cough that's she still has. Pt stated that her mucus is very yellow.     Best contact number [phone]

## 2018-06-01 ENCOUNTER — TELEPHONE (OUTPATIENT)
Dept: FAMILY MEDICINE CLINIC | Age: 38
End: 2018-06-01

## 2018-06-01 RX ORDER — FLUCONAZOLE 150 MG/1
150 TABLET ORAL DAILY
Qty: 1 TAB | Refills: 0 | Status: SHIPPED | OUTPATIENT
Start: 2018-06-01 | End: 2018-06-02

## 2018-06-01 NOTE — TELEPHONE ENCOUNTER
Pt called requesting an appt for today there were none available, she states that she has a yeast infection, she is requesting Rx Diflucan to be called into [address] [place] [address] on file so she doesn't have to go throughout the weekend. Please advise pt if this can be completed CB# [phone].

## 2018-07-12 RX ORDER — ERGOCALCIFEROL 1.25 MG/1
CAPSULE ORAL
Qty: 4 CAP | Refills: 2 | Status: SHIPPED | OUTPATIENT
Start: 2018-07-12 | End: 2018-10-25 | Stop reason: SDUPTHER

## 2018-08-29 NOTE — PATIENT INSTRUCTIONS
[address] [place]. [place], [address]  Tel.  [phone]  Fax. [address] [facility] 60  [place], [address] [facility]  Tel.  [phone]  Fax. [phone] [address]  Tel.  [phone]  Fax. [phone]     Sleep Apnea: After Your Visit  Your Care Instructions  Sleep apnea occurs when you frequently stop breathing for 10 seconds or longer during sleep. It can be mild to severe, based on the number of times per hour that you stop breathing or have slowed breathing. Blocked or narrowed airways in your nose, mouth, or throat can cause sleep apnea. Your airway can become blocked when your throat muscles and tongue relax during sleep. Sleep apnea is common, occurring in 1 out of 20 individuals. Individuals having any of the following characteristics should be evaluated and treated right away due to high risk and detrimental consequences from untreated sleep apnea:  1. Obesity  2. Congestive Heart failure  3. Atrial Fibrillation  4. Uncontrolled Hypertension  5. Type II Diabetes  6. Night-time Arrhythmias  7. Stroke  8. Pulmonary Hypertension  9. High-risk Driving Populations (pilots, truck drivers, etc.)  10. Patients Considering Weight-loss Surgery    How do you know you have sleep apnea? You probably have sleep apnea if you answer 'yes' to 3 or more of the following questions:  S - Have you been told that you Snore? T - Are you often Tired during the day? O - Has anyone Observed you stop breathing while sleeping? P- Do you have (or are being treated for) high blood Pressure? B - Are you obese (Body Mass Index > 35)? A - Is your Age 48years old or older? N - Is your Neck size greater than 16 inches? G - Are you male Gender? A sleep physician can prescribe a breathing device that prevents tissues in the throat from blocking your airway.  Or your doctor may recommend using a dental device (oral breathing device) to help keep your airway Verbal [facility] open. In some cases, surgery may be needed to remove enlarged tissues in the throat. Follow-up care is a key part of your treatment and safety. Be sure to make and go to all appointments, and call your doctor if you are having problems. It's also a good idea to know your test results and keep a list of the medicines you take. How can you care for yourself at home? · Lose weight, if needed. It may reduce the number of times you stop breathing or have slowed breathing. · Go to bed at the same time every night. · Sleep on your side. It may stop mild apnea. If you tend to roll onto your back, sew a pocket in the back of your pajama top. Put a tennis ball into the pocket, and stitch the pocket shut. This will help keep you from sleeping on your back. · Avoid alcohol and medicines such as sleeping pills and sedatives before bed. · Do not smoke. Smoking can make sleep apnea worse. If you need help quitting, talk to your doctor about stop-smoking programs and medicines. These can increase your chances of quitting for good. · Prop up the head of your bed 4 to 6 inches by putting bricks under the legs of the bed. · Treat breathing problems, such as a stuffy nose, caused by a cold or allergies. · Use a continuous positive airway pressure (CPAP) breathing machine if lifestyle changes do not help your apnea and your doctor recommends it. The machine keeps your airway from closing when you sleep. · If CPAP does not help you, ask your doctor whether you should try other breathing machines. A bilevel positive airway pressure machine has two types of air pressureâone for breathing in and one for breathing out. Another device raises or lowers air pressure as needed while you breathe. · If your nose feels dry or bleeds when using one of these machines, talk with your doctor about increasing moisture in the air. A humidifier may help.   · If your nose is runny or stuffy from using a breathing machine, talk with your doctor about using decongestants or a corticosteroid nasal spray. When should you call for help? Watch closely for changes in your health, and be sure to contact your doctor if:  · You still have sleep apnea even though you have made lifestyle changes. · You are thinking of trying a device such as CPAP. · You are having problems using a CPAP or similar machine. Where can you learn more? Go to [employer]. Enter D845 in the search box to learn more about \"Sleep Apnea: After Your Visit. \"   © 3964-7114 Healthwise, [employer]. Care instructions adapted under license by Ana Cristina Terrazas (which disclaims liability or warranty for this information). This care instruction is for use with your licensed healthcare professional. If you have questions about a medical condition or this instruction, always ask your healthcare professional. Lunsford Mas any warranty or liability for your use of this information. PROPER SLEEP HYGIENE    What to avoid  · Do not have drinks with caffeine, such as coffee or black tea, for 8 hours before bed. · Do not smoke or use other types of tobacco near bedtime. Nicotine is a stimulant and can keep you awake. · Avoid drinking alcohol late in the evening, because it can cause you to wake in the middle of the night. · Do not eat a big meal close to bedtime. If you are hungry, eat a light snack. · Do not drink a lot of water close to bedtime, because the need to urinate may wake you up during the night. · Do not read or watch TV in bed. Use the bed only for sleeping and sexual activity. What to try  · Go to bed at the same time every night, and wake up at the same time every morning. Do not take naps during the day. · Keep your bedroom quiet, dark, and cool. · Get regular exercise, but not within 3 to 4 hours of your bedtime. .  · Sleep on a comfortable pillow and mattress.   · If watching the clock makes you anxious, turn it facing away from you so you cannot see the time. · If you worry when you lie down, start a worry book. Well before bedtime, write down your worries, and then set the book and your concerns aside. · Try meditation or other relaxation techniques before you go to bed. · If you cannot fall asleep, get up and go to another room until you feel sleepy. Do something relaxing. Repeat your bedtime routine before you go to bed again. · Make your house quiet and calm about an hour before bedtime. Turn down the lights, turn off the TV, log off the computer, and turn down the volume on music. This can help you relax after a busy day. Drowsy Driving  The [address] [place] [address] Road Traffic Safety Administration cites drowsiness as a causing factor in more than 061,671 police reported crashes annually, resulting in 76,000 injuries and 1,500 deaths. Other surveys suggest 55% of people polled have driven while drowsy in the past year, 23% had fallen asleep but not crashed, 3% crashed, and 2% had and accident due to drowsy driving. Who is at risk? Young Drivers: One study of drowsy driving accidents states that 55% of the drivers were under 25 years. Of those, 75% were male. Shift Workers and Travelers: People who work overnight or travel across time zones frequently are at higher risk of experiencing Circadian Rhythm Disorders. They are trying to work and function when their body is programed to sleep. Sleep Deprived: Lack of sleep has a serious impact on your ability to pay attention or focus on a task. Consistently getting less than the average of 8 hours your body needs creates partial or cumulative sleep deprivation. Untreated Sleep Disorders: Sleep Apnea, Narcolepsy, R.L.S., and other sleep disorders (untreated) prevent a person from getting enough restful sleep. This leads to excessive daytime sleepiness and increases the risk for drowsy driving accidents by up to 7 times.   Medications / Alcohol: Even over the counter medications can cause drowsiness. Medications that impair a drivers attention should have a warning label. Alcohol naturally makes you sleepy and on its own can cause accidents. Combined with excessive drowsiness its effects are amplified. Signs of Drowsy Driving:   * You don't remember driving the last few miles   * You may drift out of your wilfred   * You are unable to focus and your thoughts wander   * You may yawn more often than normal   * You have difficulty keeping your eyes open / nodding off   * Missing traffic signs, speeding, or tailgating  Prevention-   Good sleep hygiene, lifestyle and behavioral choices have the most impact on drowsy driving. There is no substitute for sleep and the average person requires 8 hours nightly. If you find yourself driving drowsy, stop and sleep. Consider the sleep hygiene tips provided during your visit as well. Medication Refill Policy: Refills for all medications require 1 week advance notice. Please have your pharmacy fax a refill request. We are unable to fax, or call in \"controled substance\" medications and you will need to pick these prescriptions up from our office. [employer] Activation    Thank you for requesting access to [employer]. Please follow the instructions below to securely access and download your online medical record. [employer] allows you to send messages to your doctor, view your test results, renew your prescriptions, schedule appointments, and more. How Do I Sign Up? 1. In your internet browser, go to [email or web address]. 2. Click on the First Time User? Click Here link in the Sign In box. You will see the New Member Sign Up page. 3. Enter your [employer] Access Code exactly as it appears below. You will not need to use this code after youve completed the sign-up process. If you do not sign up before the expiration date, you must request a new code. [employer] Access Code:  Activation code not generated  Current [employer] Status: Active (This is the date your fastDove access code will )    4. Enter the last four digits of your Social Security Number (xxxx) and Date of Birth (mm/dd/yyyy) as indicated and click Submit. You will be taken to the next sign-up page. 5. Create a fastDove ID. This will be your fastDove login ID and cannot be changed, so think of one that is secure and easy to remember. 6. Create a fastDove password. You can change your password at any time. 7. Enter your Password Reset Question and Answer. This can be used at a later time if you forget your password. 8. Enter your e-mail address. You will receive e-mail notification when new information is available in [address] 9. Click Sign Up. You can now view and download portions of your medical record. 10. Click the Download Summary menu link to download a portable copy of your medical information. Additional Information    If you have questions, please call [phone]. Remember, [employer]Dove is NOT to be used for urgent needs. For medical emergencies, dial 911.

## 2018-10-25 ENCOUNTER — OFFICE VISIT (OUTPATIENT)
Dept: FAMILY MEDICINE CLINIC | Age: 38
End: 2018-10-25

## 2018-10-25 VITALS
WEIGHT: 178 LBS | HEIGHT: 62 IN | TEMPERATURE: 98.4 F | OXYGEN SATURATION: 99 % | HEART RATE: 86 BPM | DIASTOLIC BLOOD PRESSURE: 92 MMHG | RESPIRATION RATE: 18 BRPM | SYSTOLIC BLOOD PRESSURE: 132 MMHG | BODY MASS INDEX: 32.76 KG/M2

## 2018-10-25 DIAGNOSIS — E55.9 VITAMIN D DEFICIENCY: ICD-10-CM

## 2018-10-25 DIAGNOSIS — J40 BRONCHITIS: ICD-10-CM

## 2018-10-25 DIAGNOSIS — Z13.29 SCREENING FOR THYROID DISORDER: ICD-10-CM

## 2018-10-25 DIAGNOSIS — G47.10 HYPERSOMNIA: Primary | ICD-10-CM

## 2018-10-25 DIAGNOSIS — Z23 ENCOUNTER FOR IMMUNIZATION: ICD-10-CM

## 2018-10-25 DIAGNOSIS — R73.03 PREDIABETES: ICD-10-CM

## 2018-10-25 RX ORDER — ERGOCALCIFEROL 1.25 MG/1
CAPSULE ORAL
Qty: 4 CAP | Refills: 2 | Status: SHIPPED | OUTPATIENT
Start: 2018-10-25 | End: 2019-03-29 | Stop reason: SDUPTHER

## 2018-10-25 RX ORDER — AZITHROMYCIN 250 MG/1
TABLET, FILM COATED ORAL
Qty: 6 TAB | Refills: 0 | Status: SHIPPED | OUTPATIENT
Start: 2018-10-25 | End: 2018-10-30

## 2018-10-25 RX ORDER — CODEINE PHOSPHATE AND GUAIFENESIN 10; 100 MG/5ML; MG/5ML
5 SOLUTION ORAL
Qty: 120 ML | Refills: 0 | Status: SHIPPED | OUTPATIENT
Start: 2018-10-25

## 2018-10-25 RX ORDER — MODAFINIL 200 MG/1
400 TABLET ORAL
Qty: 60 TAB | Refills: 2 | Status: SHIPPED | OUTPATIENT
Start: 2018-10-25

## 2018-10-25 NOTE — PROGRESS NOTES
Chief Complaint   Patient presents with    Insomnia    Medication Evaluation    Cough    Labs     Patient in office today for med check, pt would like to discuss increasing medication Provigil. Feels like the medication is helping. Noticed that the increased dose helped initially. Working full time, working a second part time shift, and in school full time. Feeling tired. Pt would like to have labs checked, not fasting. Pt have c/o of cough that began 3 wks ago. Pt states cough is productive, secretions are clear with thin consistency. Pt states she has noted a whistling noise while coughing. Have been treating with Delsym, with no release noted. Reports being sick earlier this month but the cough has lingered. Denies any fever. Denies any sick contacts. Denies any relief with multiple OTCs. Denies any recent abx. Health Maintenance Due   Topic Date Due    PAP AKA CERVICAL CYTOLOGY  06/17/2018    Influenza Age 5 to Adult  08/01/2018     Would like her flu shot today. Denies any other concerns at this time. Chief Complaint   Patient presents with    Insomnia    Medication Evaluation    Cough    Labs     she is a 34y.o. year old female who presents for evalution. Reviewed PmHx, RxHx, FmHx, SocHx, AllgHx and updated and dated in the chart.     Review of Systems - negative except as listed above in the HPI    Objective:     Vitals:    10/25/18 1406   BP: (!) 132/92   Pulse: 86   Resp: 18   Temp: 98.4 °F (36.9 °C)   TempSrc: Oral   SpO2: 99%   Weight: 178 lb (80.7 kg)   Height: 5' 2\" (1.575 m)     Physical Examination: General appearance - alert, well appearing, and in no distress  Mental status - normal mood, behavior, speech, dress, motor activity, and thought processes  Eyes - pupils equal and reactive, extraocular eye movements intact  Ears - bilateral TM's and external ear canals normal  Nose - mucosal congestion, mucosal erythema, purulent rhinorrhea and sinuses normal and nontender  Mouth - mucous membranes moist, pharynx normal without lesions  Neck - supple, no significant adenopathy  Chest - clear to auscultation, no wheezes, rales or rhonchi, symmetric air entry, productive sounding cough  Heart - normal rate, regular rhythm, normal S1, S2, no murmurs    Assessment/ Plan:   Diagnoses and all orders for this visit:    1. Hypersomnia  -     modafinil (PROVIGIL) 200 mg tablet; Take 2 Tabs by mouth every morning. Max Daily Amount: 400 mg. Increase rx to 1.5 up to 2 tabs daily as directed. Follow up if sx persist or worsen. 2. Bronchitis  -     azithromycin (ZITHROMAX) 250 mg tablet; Take 2 tablets today, then take 1 tablet daily  -     guaiFENesin-codeine (GUAIFENESIN AC) 100-10 mg/5 mL solution; Take 5 mL by mouth three (3) times daily as needed for Cough. Max Daily Amount: 15 mL. Start and complete full course of zithromax. Dwp ADRs/SEs of medication. Push fluids. Rest. Saline nasal spray for nasal congestion. OTC motrin/apap for fevers. RTC if sx persist or worsen. 3. Prediabetes  -     LIPID PANEL  -     METABOLIC PANEL, COMPREHENSIVE  -     CBC WITH AUTOMATED DIFF  -     HEMOGLOBIN A1C WITH EAG  Will notify results and deviate plan based on findings. Enc pt to work on diet and lifestyle to help facilitate weight loss. 4. Vitamin D deficiency  -     ergocalciferol (VITAMIN D2) 50,000 unit capsule; TAKE ONE CAPSULE BY MOUTH ONCE WEEKLY  -     VITAMIN D, 1, 25 DIHYDROXY  Will notify results and deviate plan based on findings. 5. Screening for thyroid disorder  -     TSH 3RD GENERATION  Screening, asx.   6. Encounter for immunization  -     INFLUENZA VIRUS VAC QUAD,SPLIT,PRESV FREE SYRINGE IM  Given. Follow-up Disposition:  Return if symptoms worsen or fail to improve. I have discussed the diagnosis with the patient and the intended plan as seen in the above orders.   The patient has received an after-visit summary and questions were answered concerning future plans.     Medication Side Effects and Warnings were discussed with patient: yes  Patient Labs were reviewed and or requested: yes  Patient Past Records were reviewed and or requested  yes  Patient / Caregiver Understanding of treatment plan was verbalized during office visit YES    FRANK Silva    Patient Instructions   I have prescribed you the antibiotic Azithromycin. Take this medication as directed and complete the entire course, even if you're feeling better. If you miss a dose, take it as soon as possible. Take this medication on an empty stomach and if you're going to be out in the sun make sure you wear sunscreen to avoid developing a photosensitivity reaction. Common side effects of this medication include abdominal pain, nausea, and diarrhea. Notify your provider if symptoms do not improve one week after completing the course of this antibiotic. For your sore throat:  Zinc may reduce the severity of cold symptoms and could even shorten your illness. The newest zinc lozenges come formulated with herbs like peppermint and licorice to help soothe your throat and relieve chest congestion. Try Cold-Eeze Cold Remedy Plus Multi Symptom Relief Quick Melts ($12 for 24 lozenges). For your runny nose:  Try moistened with natural saline wipes. They are much more gentle than dry tissues. Try Puffs Fresh Faces nose wipes with Vicks ($5 for 45 wipes)    For your congestion:  Since the common cold is caused by a virus, the best medication is rest. But antihistamine-decongestant combo medications are a good bet for easing your symptoms such as post nasal drip and congestion. Try Claritin D 12 hour ($14 for 10 tablets).

## 2018-10-25 NOTE — PROGRESS NOTES
Chief Complaint   Patient presents with    Insomnia    Medication Evaluation    Cough    Labs     Patient in office today for med check, pt would like to discuss increasing medication Provigil. Pt would like to have labs checked, not fasting. Pt have c/o of cough that began 3 wks ago. Pt states cough is productive,secretions are clear with thin consistency. Pt states she has noted a whistling noise while coughing. Have been treating with Delsym, with no release noted.

## 2019-03-29 DIAGNOSIS — E55.9 VITAMIN D DEFICIENCY: ICD-10-CM

## 2019-03-29 RX ORDER — ERGOCALCIFEROL 1.25 MG/1
CAPSULE ORAL
Qty: 4 CAP | Refills: 1 | Status: SHIPPED | OUTPATIENT
Start: 2019-03-29